# Patient Record
Sex: MALE | Race: WHITE | NOT HISPANIC OR LATINO | Employment: OTHER | URBAN - METROPOLITAN AREA
[De-identification: names, ages, dates, MRNs, and addresses within clinical notes are randomized per-mention and may not be internally consistent; named-entity substitution may affect disease eponyms.]

---

## 2020-07-13 ENCOUNTER — HOSPITAL ENCOUNTER (INPATIENT)
Facility: HOSPITAL | Age: 75
LOS: 2 days | Discharge: HOME HEALTH | DRG: 177 | End: 2020-07-15
Attending: EMERGENCY MEDICINE | Admitting: INTERNAL MEDICINE
Payer: MEDICARE

## 2020-07-13 DIAGNOSIS — J18.9 PNEUMONIA OF BOTH LUNGS DUE TO INFECTIOUS ORGANISM, UNSPECIFIED PART OF LUNG: ICD-10-CM

## 2020-07-13 DIAGNOSIS — U07.1 COVID-19 VIRUS INFECTION: Primary | ICD-10-CM

## 2020-07-13 DIAGNOSIS — R55 NEAR SYNCOPE: ICD-10-CM

## 2020-07-13 DIAGNOSIS — J96.01 ACUTE RESPIRATORY FAILURE WITH HYPOXIA: ICD-10-CM

## 2020-07-13 DIAGNOSIS — Z20.822 SUSPECTED COVID-19 VIRUS INFECTION: ICD-10-CM

## 2020-07-13 PROBLEM — R79.89 ELEVATED TROPONIN: Status: ACTIVE | Noted: 2020-07-13

## 2020-07-13 PROBLEM — I10 HTN (HYPERTENSION): Status: ACTIVE | Noted: 2020-07-13

## 2020-07-13 PROBLEM — I25.10 CAD (CORONARY ARTERY DISEASE): Status: ACTIVE | Noted: 2020-07-13

## 2020-07-13 PROBLEM — E78.5 HYPERLIPEMIA: Status: ACTIVE | Noted: 2020-07-13

## 2020-07-13 LAB
ALBUMIN SERPL BCP-MCNC: 2.6 G/DL (ref 3.5–5.2)
ALP SERPL-CCNC: 80 U/L (ref 55–135)
ALT SERPL W/O P-5'-P-CCNC: 87 U/L (ref 10–44)
ANION GAP SERPL CALC-SCNC: 12 MMOL/L (ref 8–16)
AST SERPL-CCNC: 72 U/L (ref 10–40)
BASOPHILS # BLD AUTO: 0.03 K/UL (ref 0–0.2)
BASOPHILS NFR BLD: 0.2 % (ref 0–1.9)
BILIRUB SERPL-MCNC: 1 MG/DL (ref 0.1–1)
BUN SERPL-MCNC: 25 MG/DL (ref 8–23)
CALCIUM SERPL-MCNC: 8.5 MG/DL (ref 8.7–10.5)
CHLORIDE SERPL-SCNC: 99 MMOL/L (ref 95–110)
CK SERPL-CCNC: 398 U/L (ref 20–200)
CO2 SERPL-SCNC: 21 MMOL/L (ref 23–29)
CREAT SERPL-MCNC: 1.3 MG/DL (ref 0.5–1.4)
CRP SERPL-MCNC: 86.7 MG/L (ref 0–8.2)
DIFFERENTIAL METHOD: ABNORMAL
EOSINOPHIL # BLD AUTO: 0.2 K/UL (ref 0–0.5)
EOSINOPHIL NFR BLD: 1.4 % (ref 0–8)
ERYTHROCYTE [DISTWIDTH] IN BLOOD BY AUTOMATED COUNT: 13.4 % (ref 11.5–14.5)
EST. GFR  (AFRICAN AMERICAN): >60 ML/MIN/1.73 M^2
EST. GFR  (NON AFRICAN AMERICAN): 53 ML/MIN/1.73 M^2
GLUCOSE SERPL-MCNC: 128 MG/DL (ref 70–110)
HCT VFR BLD AUTO: 34.7 % (ref 40–54)
HGB BLD-MCNC: 11.5 G/DL (ref 14–18)
IMM GRANULOCYTES # BLD AUTO: 0.15 K/UL (ref 0–0.04)
IMM GRANULOCYTES NFR BLD AUTO: 1.2 % (ref 0–0.5)
LACTATE SERPL-SCNC: 1.5 MMOL/L (ref 0.5–2.2)
LDH SERPL L TO P-CCNC: 461 U/L (ref 110–260)
LYMPHOCYTES # BLD AUTO: 1.1 K/UL (ref 1–4.8)
LYMPHOCYTES NFR BLD: 9 % (ref 18–48)
MCH RBC QN AUTO: 29.6 PG (ref 27–31)
MCHC RBC AUTO-ENTMCNC: 33.1 G/DL (ref 32–36)
MCV RBC AUTO: 89 FL (ref 82–98)
MONOCYTES # BLD AUTO: 1.2 K/UL (ref 0.3–1)
MONOCYTES NFR BLD: 10.2 % (ref 4–15)
NEUTROPHILS # BLD AUTO: 9.4 K/UL (ref 1.8–7.7)
NEUTROPHILS NFR BLD: 78 % (ref 38–73)
NRBC BLD-RTO: 0 /100 WBC
PLATELET # BLD AUTO: 615 K/UL (ref 150–350)
PMV BLD AUTO: 9.8 FL (ref 9.2–12.9)
POTASSIUM SERPL-SCNC: 3.9 MMOL/L (ref 3.5–5.1)
PROCALCITONIN SERPL IA-MCNC: 0.04 NG/ML
PROT SERPL-MCNC: 7 G/DL (ref 6–8.4)
RBC # BLD AUTO: 3.89 M/UL (ref 4.6–6.2)
SARS-COV-2 RDRP RESP QL NAA+PROBE: NEGATIVE
SODIUM SERPL-SCNC: 132 MMOL/L (ref 136–145)
TROPONIN I SERPL DL<=0.01 NG/ML-MCNC: 0.01 NG/ML (ref 0–0.03)
TROPONIN I SERPL DL<=0.01 NG/ML-MCNC: 0.03 NG/ML (ref 0–0.03)
TROPONIN I SERPL DL<=0.01 NG/ML-MCNC: <0.006 NG/ML (ref 0–0.03)
WBC # BLD AUTO: 12.1 K/UL (ref 3.9–12.7)

## 2020-07-13 PROCEDURE — 96361 HYDRATE IV INFUSION ADD-ON: CPT

## 2020-07-13 PROCEDURE — 83615 LACTATE (LD) (LDH) ENZYME: CPT

## 2020-07-13 PROCEDURE — 83605 ASSAY OF LACTIC ACID: CPT

## 2020-07-13 PROCEDURE — G0378 HOSPITAL OBSERVATION PER HR: HCPCS

## 2020-07-13 PROCEDURE — 21400001 HC TELEMETRY ROOM

## 2020-07-13 PROCEDURE — 93010 ELECTROCARDIOGRAM REPORT: CPT | Mod: ,,, | Performed by: INTERNAL MEDICINE

## 2020-07-13 PROCEDURE — 84145 PROCALCITONIN (PCT): CPT

## 2020-07-13 PROCEDURE — 96374 THER/PROPH/DIAG INJ IV PUSH: CPT

## 2020-07-13 PROCEDURE — 99291 CRITICAL CARE FIRST HOUR: CPT | Mod: 25

## 2020-07-13 PROCEDURE — 87040 BLOOD CULTURE FOR BACTERIA: CPT | Mod: 59

## 2020-07-13 PROCEDURE — 84484 ASSAY OF TROPONIN QUANT: CPT | Mod: 91

## 2020-07-13 PROCEDURE — 25000003 PHARM REV CODE 250: Performed by: NURSE PRACTITIONER

## 2020-07-13 PROCEDURE — 80053 COMPREHEN METABOLIC PANEL: CPT

## 2020-07-13 PROCEDURE — 63600175 PHARM REV CODE 636 W HCPCS: Performed by: EMERGENCY MEDICINE

## 2020-07-13 PROCEDURE — 82550 ASSAY OF CK (CPK): CPT

## 2020-07-13 PROCEDURE — 82728 ASSAY OF FERRITIN: CPT

## 2020-07-13 PROCEDURE — 36415 COLL VENOUS BLD VENIPUNCTURE: CPT

## 2020-07-13 PROCEDURE — 63600175 PHARM REV CODE 636 W HCPCS: Performed by: NURSE PRACTITIONER

## 2020-07-13 PROCEDURE — 96375 TX/PRO/DX INJ NEW DRUG ADDON: CPT

## 2020-07-13 PROCEDURE — 93010 EKG 12-LEAD: ICD-10-PCS | Mod: ,,, | Performed by: INTERNAL MEDICINE

## 2020-07-13 PROCEDURE — U0002 COVID-19 LAB TEST NON-CDC: HCPCS

## 2020-07-13 PROCEDURE — 96372 THER/PROPH/DIAG INJ SC/IM: CPT | Mod: 59

## 2020-07-13 PROCEDURE — 85025 COMPLETE CBC W/AUTO DIFF WBC: CPT

## 2020-07-13 PROCEDURE — 86140 C-REACTIVE PROTEIN: CPT

## 2020-07-13 PROCEDURE — 84484 ASSAY OF TROPONIN QUANT: CPT

## 2020-07-13 PROCEDURE — 93005 ELECTROCARDIOGRAM TRACING: CPT

## 2020-07-13 RX ORDER — METOPROLOL SUCCINATE 25 MG/1
25 TABLET, EXTENDED RELEASE ORAL DAILY
Status: DISCONTINUED | OUTPATIENT
Start: 2020-07-14 | End: 2020-07-15 | Stop reason: HOSPADM

## 2020-07-13 RX ORDER — PANTOPRAZOLE SODIUM 40 MG/1
40 TABLET, DELAYED RELEASE ORAL DAILY
Status: DISCONTINUED | OUTPATIENT
Start: 2020-07-14 | End: 2020-07-14

## 2020-07-13 RX ORDER — ENOXAPARIN SODIUM 100 MG/ML
40 INJECTION SUBCUTANEOUS EVERY 24 HOURS
Status: DISCONTINUED | OUTPATIENT
Start: 2020-07-13 | End: 2020-07-15 | Stop reason: HOSPADM

## 2020-07-13 RX ORDER — METOPROLOL SUCCINATE 25 MG/1
25 TABLET, EXTENDED RELEASE ORAL
COMMUNITY
Start: 2017-08-11

## 2020-07-13 RX ORDER — ATORVASTATIN CALCIUM 80 MG/1
TABLET, FILM COATED ORAL
COMMUNITY
Start: 2019-02-25

## 2020-07-13 RX ORDER — AZITHROMYCIN 250 MG/1
250 TABLET, FILM COATED ORAL DAILY
COMMUNITY

## 2020-07-13 RX ORDER — SODIUM CHLORIDE 9 MG/ML
INJECTION, SOLUTION INTRAVENOUS CONTINUOUS
Status: DISCONTINUED | OUTPATIENT
Start: 2020-07-13 | End: 2020-07-13

## 2020-07-13 RX ORDER — LEVOTHYROXINE SODIUM 75 UG/1
75 TABLET ORAL
Status: DISCONTINUED | OUTPATIENT
Start: 2020-07-14 | End: 2020-07-15 | Stop reason: HOSPADM

## 2020-07-13 RX ORDER — FINASTERIDE 5 MG/1
5 TABLET, FILM COATED ORAL NIGHTLY
Status: DISCONTINUED | OUTPATIENT
Start: 2020-07-13 | End: 2020-07-15 | Stop reason: HOSPADM

## 2020-07-13 RX ORDER — POTASSIUM CHLORIDE 750 MG/1
TABLET, EXTENDED RELEASE ORAL
COMMUNITY
Start: 2018-11-08

## 2020-07-13 RX ORDER — ASPIRIN 81 MG/1
81 TABLET ORAL DAILY
Status: DISCONTINUED | OUTPATIENT
Start: 2020-07-14 | End: 2020-07-15 | Stop reason: HOSPADM

## 2020-07-13 RX ORDER — ATORVASTATIN CALCIUM 40 MG/1
80 TABLET, FILM COATED ORAL NIGHTLY
Status: DISCONTINUED | OUTPATIENT
Start: 2020-07-13 | End: 2020-07-15 | Stop reason: HOSPADM

## 2020-07-13 RX ORDER — IPRATROPIUM BROMIDE AND ALBUTEROL SULFATE 2.5; .5 MG/3ML; MG/3ML
3 SOLUTION RESPIRATORY (INHALATION) EVERY 4 HOURS PRN
Status: DISCONTINUED | OUTPATIENT
Start: 2020-07-13 | End: 2020-07-15 | Stop reason: HOSPADM

## 2020-07-13 RX ORDER — FINASTERIDE 5 MG/1
TABLET, FILM COATED ORAL
COMMUNITY
Start: 2019-07-22

## 2020-07-13 RX ORDER — TAMSULOSIN HYDROCHLORIDE 0.4 MG/1
CAPSULE ORAL
COMMUNITY
Start: 2019-05-13

## 2020-07-13 RX ORDER — PANTOPRAZOLE SODIUM 40 MG/1
TABLET, DELAYED RELEASE ORAL
COMMUNITY
Start: 2019-05-13

## 2020-07-13 RX ORDER — IRBESARTAN 150 MG/1
TABLET ORAL
COMMUNITY
Start: 2019-02-14

## 2020-07-13 RX ORDER — LEVOTHYROXINE SODIUM 75 UG/1
TABLET ORAL
COMMUNITY
Start: 2019-05-06

## 2020-07-13 RX ORDER — NITROGLYCERIN 0.4 MG/1
0.4 TABLET SUBLINGUAL
COMMUNITY

## 2020-07-13 RX ORDER — BUTALBITAL, ACETAMINOPHEN AND CAFFEINE 50; 325; 40 MG/1; MG/1; MG/1
1 TABLET ORAL 3 TIMES DAILY PRN
COMMUNITY
Start: 2018-08-23 | End: 2020-07-13

## 2020-07-13 RX ORDER — RANOLAZINE 1000 MG/1
1000 TABLET, EXTENDED RELEASE ORAL
COMMUNITY
Start: 2018-11-16

## 2020-07-13 RX ORDER — LOSARTAN POTASSIUM 25 MG/1
25 TABLET ORAL DAILY
Status: DISCONTINUED | OUTPATIENT
Start: 2020-07-14 | End: 2020-07-15 | Stop reason: HOSPADM

## 2020-07-13 RX ORDER — ISOSORBIDE MONONITRATE 30 MG/1
TABLET, EXTENDED RELEASE ORAL
COMMUNITY
Start: 2019-03-30

## 2020-07-13 RX ORDER — VITAMIN E 268 MG
400 CAPSULE ORAL
COMMUNITY

## 2020-07-13 RX ORDER — FUROSEMIDE 20 MG/1
TABLET ORAL
COMMUNITY
Start: 2019-05-13

## 2020-07-13 RX ORDER — ASPIRIN 81 MG/1
81 TABLET ORAL
COMMUNITY

## 2020-07-13 RX ADMIN — ATORVASTATIN CALCIUM 80 MG: 40 TABLET, FILM COATED ORAL at 09:07

## 2020-07-13 RX ADMIN — SODIUM CHLORIDE, SODIUM LACTATE, POTASSIUM CHLORIDE, AND CALCIUM CHLORIDE 1000 ML: .6; .31; .03; .02 INJECTION, SOLUTION INTRAVENOUS at 12:07

## 2020-07-13 RX ADMIN — AZITHROMYCIN MONOHYDRATE 500 MG: 500 INJECTION, POWDER, LYOPHILIZED, FOR SOLUTION INTRAVENOUS at 09:07

## 2020-07-13 RX ADMIN — CEFTRIAXONE 1 G: 1 INJECTION, SOLUTION INTRAVENOUS at 06:07

## 2020-07-13 RX ADMIN — FINASTERIDE 5 MG: 5 TABLET, FILM COATED ORAL at 09:07

## 2020-07-13 RX ADMIN — ENOXAPARIN SODIUM 40 MG: 40 INJECTION SUBCUTANEOUS at 04:07

## 2020-07-13 NOTE — ED NOTES
Per Katie Irwin, NP patient needs COVID retest if greater that >72 hours. Verbal order for COVID swab.

## 2020-07-13 NOTE — H&P
[facility] Hospital Medicine  History & Physical    Patient Name: Brenton Ugalde  MRN: [id number]  Admission Date: 7/13/2020  Attending Physician: Luis Lo MD    Primary Care Provider: Lizy Ayala MD         Patient information was obtained from patient, relative(s) and ER records.     Subjective:     Principal Problem:Acute respiratory failure with hypoxia    Chief Complaint:   Chief Complaint   Patient presents with    COVID-19 Concerns     positive COVID, diagnosed with bilateral PNA, recently d/c from [facility], had a syncopal episode, O2 has been in the high 80's over the last 2 days        HPI: Brenton Ugalde is a 75 y.o. male patient with a PMHx of HTN, CAD, BPH, Hyperlipidemia, Hypothyroidism, and   pneumonia who presents to the Emergency Department for evaluation of worsening SOB. Patient was dx with COVID by PCP on 7/2/20 and was given a z-pack. On 7/7/20 pt admitted to [facility] [place] with bilateral pneumonia and was later transferred to [facility], and was discharged yesterday. Pt had a home pulse ox saturation in the mid 70's. Associated sxs include N/V, generalized myalgias, cough, and generalized weakness. Patient denies any CP, diarrhea, HA, dizziness, numbness, and all other sxs at this time. In the ED, Na 132, BUN 25, CRP 86.7, , troponin 0.034, CXR revealed bilateral PNA. Patient placed in observation for PNA, elevated troponin, and hypoxia.         Past Medical History:   Diagnosis Date    Hypertension     PNA (pneumonia)        Past Surgical History:   Procedure Laterality Date    CARDIAC SURGERY  2008       Review of patient's allergies indicates:  No Known Allergies    No current facility-administered medications on file prior to encounter.      Current Outpatient Medications on File Prior to Encounter   Medication Sig    ALBUTEROL INHL Inhale into the lungs.    aspirin (ECOTRIN) 81 MG EC tablet Take 81 mg by mouth.    atorvastatin (LIPITOR)  80 MG tablet TAKE ONE TABLET BY MOUTH EVERY EVENING    azithromycin (ZITHROMAX Z-CATY) 250 MG tablet Take 250 mg by mouth once daily.    finasteride (PROSCAR) 5 mg tablet TAKE ONE TABLET BY MOUTH EVERY DAY IN THE EVENING    furosemide (LASIX) 20 MG tablet TAKE ONE TABLET BY MOUTH EVERY DAY.    irbesartan (AVAPRO) 150 MG tablet TAKE ONE TABLET BY MOUTH EVERY DAY    levothyroxine (SYNTHROID) 75 MCG tablet TAKE ONE TABLET BY MOUTH EVERY DAY ON AN EMPTY STOMACH    metoprolol succinate (TOPROL-XL) 25 MG 24 hr tablet Take 25 mg by mouth.    pantoprazole (PROTONIX) 40 MG tablet TAKE ONE TABLET EVERY DAY    potassium chloride SA (K-DUR,KLOR-CON) 10 MEQ tablet TAKE ONE TABLET EVERY DAY    ranolazine (RANEXA) 1,000 mg Tb12 Take 1,000 mg by mouth.    tamsulosin (FLOMAX) 0.4 mg Cap TAKE ONE CAPSULE BY MOUTH EVERY DAY IN THE EVENING    vitamin E 400 UNIT capsule Take 400 Units by mouth.    [DISCONTINUED] butalbital-acetaminophen-caffeine -40 mg (FIORICET, ESGIC) -40 mg per tablet Take 1 tablet by mouth 3 (three) times daily as needed.    isosorbide mononitrate (IMDUR) 30 MG 24 hr tablet     nitroGLYCERIN (NITROSTAT) 0.4 MG SL tablet Place 0.4 mg under the tongue.     Family History     None        Tobacco Use    Smoking status: Never Smoker    Smokeless tobacco: Never Used   Substance and Sexual Activity    Alcohol use: Not Currently    Drug use: Never    Sexual activity: Not on file     Review of Systems   Constitutional: Positive for fatigue.   HENT: Negative.    Eyes: Negative.    Respiratory: Positive for cough and shortness of breath.    Cardiovascular: Negative.  Negative for chest pain, palpitations and leg swelling.   Gastrointestinal: Positive for nausea and vomiting. Negative for abdominal pain and diarrhea.   Endocrine: Negative.    Genitourinary: Negative.  Negative for dysuria, flank pain, frequency and urgency.   Musculoskeletal: Positive for myalgias.   Skin: Negative.     Allergic/Immunologic: Negative.    Neurological: Negative.  Negative for dizziness, speech difficulty, weakness and headaches.   Hematological: Negative.    Psychiatric/Behavioral: Negative.      Objective:     Vital Signs (Most Recent):  Temp: 98.1 °F (36.7 °C) (07/13/20 1231)  Pulse: 67 (07/13/20 1600)  Resp: 18 (07/13/20 1600)  BP: 127/72 (07/13/20 1601)  SpO2: 96 % (07/13/20 1600) Vital Signs (24h Range):  Temp:  [98.1 °F (36.7 °C)] 98.1 °F (36.7 °C)  Pulse:  [66-75] 67  Resp:  [18-20] 18  SpO2:  [92 %-96 %] 96 %  BP: ()/(55-72) 127/72     Weight: 81.2 kg (179 lb)  Body mass index is 25.68 kg/m².    Physical Exam  Vitals signs and nursing note reviewed.   Constitutional:       Appearance: Normal appearance. He is well-developed.   HENT:      Head: Normocephalic and atraumatic.   Eyes:      Pupils: Pupils are equal, round, and reactive to light.   Neck:      Musculoskeletal: Normal range of motion and neck supple.   Cardiovascular:      Rate and Rhythm: Normal rate and regular rhythm.      Pulses: Normal pulses.      Heart sounds: Normal heart sounds.   Pulmonary:      Effort: Pulmonary effort is normal. No tachypnea, accessory muscle usage or respiratory distress.      Breath sounds: Examination of the right-lower field reveals rales. Examination of the left-lower field reveals rales. Rales present.   Abdominal:      General: Bowel sounds are normal.      Palpations: Abdomen is soft.      Tenderness: There is no abdominal tenderness.   Musculoskeletal: Normal range of motion.   Skin:     General: Skin is warm and dry.      Capillary Refill: Capillary refill takes less than 2 seconds.   Neurological:      Mental Status: He is alert and oriented to person, place, and time.      Deep Tendon Reflexes: Reflexes are normal and symmetric.   Psychiatric:         Speech: Speech normal.         Behavior: Behavior normal.         Thought Content: Thought content normal.         Judgment: Judgment normal.           Significant Labs:   Blood Culture: No results for input(s): LABBLOO in the last 48 hours.  BMP:   Recent Labs   Lab 07/13/20  1251   *   *   K 3.9   CL 99   CO2 21*   BUN 25*   CREATININE 1.3   CALCIUM 8.5*     CBC:   Recent Labs   Lab 07/13/20  1251   WBC 12.10   HGB 11.5*   HCT 34.7*   *     CMP:   Recent Labs   Lab 07/13/20  1251   *   K 3.9   CL 99   CO2 21*   *   BUN 25*   CREATININE 1.3   CALCIUM 8.5*   PROT 7.0   ALBUMIN 2.6*   BILITOT 1.0   ALKPHOS 80   AST 72*   ALT 87*   ANIONGAP 12   EGFRNONAA 53*     Urine Studies: No results for input(s): COLORU, APPEARANCEUA, PHUR, SPECGRAV, PROTEINUA, GLUCUA, KETONESU, BILIRUBINUA, OCCULTUA, NITRITE, UROBILINOGEN, LEUKOCYTESUR, RBCUA, WBCUA, BACTERIA, SQUAMEPITHEL, HYALINECASTS in the last 48 hours.    Invalid input(s): WRIGHTSUR  All pertinent labs within the past 24 hours have been reviewed.    Significant Imaging:  Imaging Results          X-Ray Chest AP Portable (Final result)  Result time 07/13/20 13:35:01    Final result by Ean Franco MD (07/13/20 13:35:01)                 Impression:      1.  Significant peripheral and basilar ground-glass/alveolar infiltrates consistent with the history given of suspected COVID-19 infection.    2.  Incidental findings as noted above.      Electronically signed by: Ean Franco MD  Date:    07/13/2020  Time:    13:35             Narrative:    EXAMINATION:  XR CHEST AP PORTABLE    CLINICAL HISTORY:  Suspected Covid-19 Virus Infection;    COMPARISON:  No comparison studies are available.    FINDINGS:  EKG leads overlie the chest.  Peripheral patchy ground-glass and alveolar opacities are seen throughout the mid lower lungs as well as in the left retrocardiac region.  The cardiac silhouette size is normal. The trachea is midline and the mediastinal width is normal. Negative for effusion or pneumothorax.  Pulmonary vasculature is normal. Negative for osseous abnormalities. Median sternotomy  wires and CABG changes.  Aortic arch calcifications.  Degenerative changes of the spine and both shoulder girdles.                              Assessment/Plan:     * Acute respiratory failure with hypoxia  Likely due to Bilateral PNA   O2 sats 95-98% on RA   Supplemental oxygen   Duoneb tx prn         Elevated troponin  Denies chest pain   EKG unrevealing   Initial troponin 0.034  Serial troponin         PNA (pneumonia)  Blood cx  IV rocephin and azithromycin   Duoneb tx prn   Supplemental oxygen   Pt was recently admitted to [facility] for bilateral pna. Spoke with pharmacist at [facility] pt did not receive abx during his stay.       Hyperlipemia  Continue statin         CAD (coronary artery disease)  Continue ASA, BB, Losartan, Statin   Denies any chest pain   EKG unrevealing         HTN (hypertension)  Continue losartan and BB with holding parameters   Monitor closely       VTE Risk Mitigation (From admission, onward)         Ordered     enoxaparin injection 40 mg  Every 24 hours      07/13/20 1619     IP VTE HIGH RISK PATIENT  Once      07/13/20 1619     Place sequential compression device  Until discontinued      07/13/20 1619                   Phyllis Lock NP  Department of Hospital Medicine   [facility]

## 2020-07-13 NOTE — ED PROVIDER NOTES
SCRIBE #1 NOTE: I, Emoryodilia White, am scribing for, and in the presence of, Bruce Shah MD. I have scribed the entire note.       History     Chief Complaint   Patient presents with    COVID-19 Concerns     positive COVID, diagnosed with bilateral PNA, recently d/c from [facility], had a syncopal episode, O2 has been in the high 80's over the last 2 days     Review of patient's allergies indicates:  No Known Allergies      History of Present Illness     HPI    7/13/2020, 12:14 PM  History obtained from the patient      History of Present Illness: Brenton Ugalde is a 75 y.o. male patient with a PMHx of pneumonia who presents to the Emergency Department for evaluation of COVID-19 concerns which onset gradually 2 days PTA. Pt was dx'd with bilateral pneumonia and admitted to [facility] on [facility], was transferred to [facility], and was discharged from [facility] 2 days ago. Pt had a home pulse ox saturation in the mid 70's as well as increasing SOB. Symptoms are worsening and moderate in severity. No mitigating or exacerbating factors reported. Associated sxs include SOB, N/V, generalized myalgias, cough, and generalized weakness. Patient denies any CP, diarrhea, HA, dizziness, numbness, and all other sxs at this time. No prior Tx reported. No further complaints or concerns at this time.       Arrival mode: Personal vehicle    PCP: Lizy Ayala MD        Past Medical History:  Past Medical History:   Diagnosis Date    Hypertension     PNA (pneumonia)        Past Surgical History:  Past Surgical History:   Procedure Laterality Date    CARDIAC SURGERY  2008         Family History:  History reviewed. No pertinent family history.     Social History:  Social History     Tobacco Use    Smoking status: Unknown   Substance and Sexual Activity    Alcohol use: Unknown    Drug use: Unknown    Sexual activity: Unknown        Review of Systems     Review of Systems   Constitutional: Negative for chills and fever.   HENT:  Negative for sore throat.    Respiratory: Positive for cough and shortness of breath.    Cardiovascular: Negative for chest pain and leg swelling.   Gastrointestinal: Positive for nausea and vomiting. Negative for abdominal pain and diarrhea.   Genitourinary: Negative for dysuria.   Musculoskeletal: Positive for myalgias (Generalized). Negative for back pain, neck pain and neck stiffness.   Skin: Negative for rash and wound.   Neurological: Positive for weakness (Generalized). Negative for dizziness, light-headedness, numbness and headaches.   Hematological: Does not bruise/bleed easily.   All other systems reviewed and are negative.     Physical Exam     Initial Vitals   BP Pulse Resp Temp SpO2   07/13/20 1157 07/13/20 1157 07/13/20 1231 07/13/20 1231 07/13/20 1157   (!) 94/55 67 20 98.1 °F (36.7 °C) (!) 92 %      MAP       --                 Physical Exam  Nursing Notes and Vital Signs Reviewed.  Constitutional: Patient is in no acute distress. Ill-appearing.  Head: Atraumatic. Normocephalic.  Eyes: PERRL. EOM intact. Conjunctivae are not pale. No scleral icterus.  ENT: Mucous membranes are dry. Oropharynx is clear and symmetric.    Neck: Supple. Full ROM.  Cardiovascular: Regular rate. Regular rhythm. No murmurs, rubs, or gallops. Distal pulses are 2+ and symmetric.  Pulmonary/Chest: Tachypnea. Scattered crackles.  Abdominal: Soft and non-distended. There is no tenderness to palpation. No rebound or guarding.  Genitourinary: No CVA tenderness  Musculoskeletal: Moves all extremities. No obvious deformities. No edema. No calf tenderness.  Skin: Warm and dry.  Neurological:  Alert, awake, and appropriate.  Normal speech.  No acute focal neurological deficits are appreciated.  Psychiatric: Normal affect. Good eye contact. Appropriate in content.     ED Course   Critical Care    Date/Time: 7/13/2020 1:08 PM  Performed by: Bruce Shah MD  Authorized by: Bruce Shah MD   Direct patient critical care  time: 15 minutes  Additional history critical care time: 5 minutes  Ordering / reviewing critical care time: 10 minutes  Documentation critical care time: 5 minutes  Consulting other physicians critical care time: 5 minutes  Total critical care time (exclusive of procedural time) : 40 minutes  Critical care time was exclusive of separately billable procedures and treating other patients and teaching time.  Critical care was necessary to treat or prevent imminent or life-threatening deterioration of the following conditions: respiratory failure (Hypoxia, severe COVID19 infection).  Critical care was time spent personally by me on the following activities: blood draw for specimens, development of treatment plan with patient or surrogate, discussions with consultants, interpretation of cardiac output measurements, evaluation of patient's response to treatment, examination of patient, obtaining history from patient or surrogate, ordering and performing treatments and interventions, ordering and review of laboratory studies, ordering and review of radiographic studies, pulse oximetry, re-evaluation of patient's condition and review of old charts.        ED Vital Signs:  Vitals:    07/13/20 1522 07/13/20 1531 07/13/20 1600 07/13/20 1601   BP:  135/69  127/72   Pulse:  67 67    Resp:   18    Temp:       TempSrc:       SpO2:  95% 96%    Weight: 81.2 kg (179 lb)      Height:        07/13/20 1627 07/13/20 1631 07/13/20 1708 07/13/20 1735   BP:  122/72  (!) 149/71   Pulse: 70 70  70   Resp: 19   17   Temp:   98.5 °F (36.9 °C) 97.9 °F (36.6 °C)   TempSrc:   Oral Oral   SpO2: 97% 95%  (!) 94%   Weight:       Height:        07/13/20 1955 07/14/20 0015 07/14/20 0416 07/14/20 0500   BP: 102/62 98/62 (!) 97/59    Pulse: 74 78 70    Resp: 18 18 18    Temp: 98.1 °F (36.7 °C) 98.4 °F (36.9 °C) 98.2 °F (36.8 °C)    TempSrc: Oral Axillary Oral    SpO2: 96% (!) 92% 98%    Weight:    78.9 kg (173 lb 15.1 oz)   Height:        07/14/20 0705  07/14/20 0815 07/14/20 0900   BP:  115/66    Pulse: 71 71 73   Resp:  18    Temp:  98.2 °F (36.8 °C)    TempSrc:  Oral    SpO2:  (!) 92% (!) 92%   Weight:      Height:          Abnormal Lab Results:  Labs Reviewed   CBC W/ AUTO DIFFERENTIAL - Abnormal; Notable for the following components:       Result Value    RBC 3.89 (*)     Hemoglobin 11.5 (*)     Hematocrit 34.7 (*)     Platelets 615 (*)     Immature Granulocytes 1.2 (*)     Gran # (ANC) 9.4 (*)     Immature Grans (Abs) 0.15 (*)     Mono # 1.2 (*)     Gran% 78.0 (*)     Lymph% 9.0 (*)     All other components within normal limits   COMPREHENSIVE METABOLIC PANEL - Abnormal; Notable for the following components:    Sodium 132 (*)     CO2 21 (*)     Glucose 128 (*)     BUN, Bld 25 (*)     Calcium 8.5 (*)     Albumin 2.6 (*)     AST 72 (*)     ALT 87 (*)     eGFR if non  53 (*)     All other components within normal limits   C-REACTIVE PROTEIN - Abnormal; Notable for the following components:    CRP 86.7 (*)     All other components within normal limits   LACTATE DEHYDROGENASE - Abnormal; Notable for the following components:     (*)     All other components within normal limits   CK - Abnormal; Notable for the following components:     (*)     All other components within normal limits   TROPONIN I - Abnormal; Notable for the following components:    Troponin I 0.034 (*)     All other components within normal limits   LACTIC ACID, PLASMA   PROCALCITONIN   SARS-COV-2 RNA AMPLIFICATION, QUAL        All Lab Results:  Results for orders placed or performed during the hospital encounter of 07/13/20   Blood culture x two cultures. Draw prior to antibiotics.    Specimen: Peripheral, Antecubital, Left; Blood   Result Value Ref Range    Blood Culture, Routine No Growth to date    Blood culture x two cultures. Draw prior to antibiotics.    Specimen: Peripheral, Wrist, Left; Blood   Result Value Ref Range    Blood Culture, Routine No Growth to  date    CBC auto differential   Result Value Ref Range    WBC 12.10 3.90 - 12.70 K/uL    RBC 3.89 (L) 4.60 - 6.20 M/uL    Hemoglobin 11.5 (L) 14.0 - 18.0 g/dL    Hematocrit 34.7 (L) 40.0 - 54.0 %    Mean Corpuscular Volume 89 82 - 98 fL    Mean Corpuscular Hemoglobin 29.6 27.0 - 31.0 pg    Mean Corpuscular Hemoglobin Conc 33.1 32.0 - 36.0 g/dL    RDW 13.4 11.5 - 14.5 %    Platelets 615 (H) 150 - 350 K/uL    MPV 9.8 9.2 - 12.9 fL    Immature Granulocytes 1.2 (H) 0.0 - 0.5 %    Gran # (ANC) 9.4 (H) 1.8 - 7.7 K/uL    Immature Grans (Abs) 0.15 (H) 0.00 - 0.04 K/uL    Lymph # 1.1 1.0 - 4.8 K/uL    Mono # 1.2 (H) 0.3 - 1.0 K/uL    Eos # 0.2 0.0 - 0.5 K/uL    Baso # 0.03 0.00 - 0.20 K/uL    nRBC 0 0 /100 WBC    Gran% 78.0 (H) 38.0 - 73.0 %    Lymph% 9.0 (L) 18.0 - 48.0 %    Mono% 10.2 4.0 - 15.0 %    Eosinophil% 1.4 0.0 - 8.0 %    Basophil% 0.2 0.0 - 1.9 %    Differential Method Automated    Comprehensive metabolic panel   Result Value Ref Range    Sodium 132 (L) 136 - 145 mmol/L    Potassium 3.9 3.5 - 5.1 mmol/L    Chloride 99 95 - 110 mmol/L    CO2 21 (L) 23 - 29 mmol/L    Glucose 128 (H) 70 - 110 mg/dL    BUN, Bld 25 (H) 8 - 23 mg/dL    Creatinine 1.3 0.5 - 1.4 mg/dL    Calcium 8.5 (L) 8.7 - 10.5 mg/dL    Total Protein 7.0 6.0 - 8.4 g/dL    Albumin 2.6 (L) 3.5 - 5.2 g/dL    Total Bilirubin 1.0 0.1 - 1.0 mg/dL    Alkaline Phosphatase 80 55 - 135 U/L    AST 72 (H) 10 - 40 U/L    ALT 87 (H) 10 - 44 U/L    Anion Gap 12 8 - 16 mmol/L    eGFR if African American >60 >60 mL/min/1.73 m^2    eGFR if non African American 53 (A) >60 mL/min/1.73 m^2   C-Reactive Protein   Result Value Ref Range    CRP 86.7 (H) 0.0 - 8.2 mg/L   Ferritin   Result Value Ref Range    Ferritin 1,962 (H) 20.0 - 300.0 ng/mL   Lactate Dehydrogenase   Result Value Ref Range     (H) 110 - 260 U/L   CK   Result Value Ref Range     (H) 20 - 200 U/L   Lactic Acid, Plasma   Result Value Ref Range    Lactate (Lactic Acid) 1.5 0.5 - 2.2 mmol/L    Troponin I   Result Value Ref Range    Troponin I 0.034 (H) 0.000 - 0.026 ng/mL   Procalcitonin   Result Value Ref Range    Procalcitonin 0.04 <0.25 ng/mL   COVID-19 Rapid Screening   Result Value Ref Range    SARS-CoV-2 RNA, Amplification, Qual Negative Negative   Troponin I   Result Value Ref Range    Troponin I <0.006 0.000 - 0.026 ng/mL   Troponin I   Result Value Ref Range    Troponin I 0.015 0.000 - 0.026 ng/mL   Basic Metabolic Panel (BMP)   Result Value Ref Range    Sodium 133 (L) 136 - 145 mmol/L    Potassium 3.8 3.5 - 5.1 mmol/L    Chloride 102 95 - 110 mmol/L    CO2 24 23 - 29 mmol/L    Glucose 94 70 - 110 mg/dL    BUN, Bld 13 8 - 23 mg/dL    Creatinine 0.8 0.5 - 1.4 mg/dL    Calcium 7.9 (L) 8.7 - 10.5 mg/dL    Anion Gap 7 (L) 8 - 16 mmol/L    eGFR if African American >60 >60 mL/min/1.73 m^2    eGFR if non African American >60 >60 mL/min/1.73 m^2   CBC with Automated Differential   Result Value Ref Range    WBC 9.46 3.90 - 12.70 K/uL    RBC 3.80 (L) 4.60 - 6.20 M/uL    Hemoglobin 11.1 (L) 14.0 - 18.0 g/dL    Hematocrit 34.1 (L) 40.0 - 54.0 %    Mean Corpuscular Volume 90 82 - 98 fL    Mean Corpuscular Hemoglobin 29.2 27.0 - 31.0 pg    Mean Corpuscular Hemoglobin Conc 32.6 32.0 - 36.0 g/dL    RDW 13.2 11.5 - 14.5 %    Platelets 616 (H) 150 - 350 K/uL    MPV 9.5 9.2 - 12.9 fL    Immature Granulocytes 1.3 (H) 0.0 - 0.5 %    Gran # (ANC) 6.4 1.8 - 7.7 K/uL    Immature Grans (Abs) 0.12 (H) 0.00 - 0.04 K/uL    Lymph # 1.3 1.0 - 4.8 K/uL    Mono # 1.4 (H) 0.3 - 1.0 K/uL    Eos # 0.3 0.0 - 0.5 K/uL    Baso # 0.02 0.00 - 0.20 K/uL    nRBC 0 0 /100 WBC    Gran% 67.1 38.0 - 73.0 %    Lymph% 14.2 (L) 18.0 - 48.0 %    Mono% 14.3 4.0 - 15.0 %    Eosinophil% 2.9 0.0 - 8.0 %    Basophil% 0.2 0.0 - 1.9 %    Differential Method Automated        Imaging Results:  Imaging Results          X-Ray Chest AP Portable (Final result)  Result time 07/13/20 13:35:01    Final result by Ean Franco MD (07/13/20 13:35:01)                  Impression:      1.  Significant peripheral and basilar ground-glass/alveolar infiltrates consistent with the history given of suspected COVID-19 infection.    2.  Incidental findings as noted above.      Electronically signed by: Ean Franco MD  Date:    07/13/2020  Time:    13:35             Narrative:    EXAMINATION:  XR CHEST AP PORTABLE    CLINICAL HISTORY:  Suspected Covid-19 Virus Infection;    COMPARISON:  No comparison studies are available.    FINDINGS:  EKG leads overlie the chest.  Peripheral patchy ground-glass and alveolar opacities are seen throughout the mid lower lungs as well as in the left retrocardiac region.  The cardiac silhouette size is normal. The trachea is midline and the mediastinal width is normal. Negative for effusion or pneumothorax.  Pulmonary vasculature is normal. Negative for osseous abnormalities. Median sternotomy wires and CABG changes.  Aortic arch calcifications.  Degenerative changes of the spine and both shoulder girdles.                                 The EKG was ordered, reviewed, and independently interpreted by the ED provider.  Interpretation time: 1246  Rate: 67 BPM  Rhythm: normal sinus rhythm  Interpretation: No acute ST changes. No STEMI.           The Emergency Provider reviewed the vital signs and test results, which are outlined above.     ED Discussion     3:01 PM: Discussed case with Luis Martinez MD (Hospital Medicine). Dr. Baird agrees with current care and management of pt and accepts admission.   Admitting Service: Hospital Medicine  Admitting Physician: Dr. Baird  Admit to: Observation/Tele    3:09 PM: Re-evaluated pt. I have discussed test results, shared treatment plan, and the need for admission with patient at bedside. Pt expresses understanding at this time and agrees with all information. All questions answered. Pt has no further questions or concerns at this time. Pt is ready for admit.       Medical Decision Making:    Clinical Tests:   Lab Tests: Ordered and Reviewed  Radiological Study: Ordered and Reviewed  Medical Tests: Ordered and Reviewed           ED Medication(s):  Medications   aspirin EC tablet 81 mg (81 mg Oral Given 7/14/20 0932)   atorvastatin tablet 80 mg (80 mg Oral Given 7/13/20 2121)   finasteride tablet 5 mg (5 mg Oral Given 7/13/20 2121)   levothyroxine tablet 75 mcg (75 mcg Oral Given 7/14/20 0542)   metoprolol succinate (TOPROL-XL) 24 hr tablet 25 mg (25 mg Oral Given 7/14/20 0932)   losartan tablet 25 mg (25 mg Oral Given 7/14/20 0932)   enoxaparin injection 40 mg (40 mg Subcutaneous Given 7/13/20 1640)   albuterol-ipratropium 2.5 mg-0.5 mg/3 mL nebulizer solution 3 mL (has no administration in time range)   zinc sulfate capsule 220 mg (220 mg Oral Given 7/14/20 1117)   ascorbic acid (vitamin C) tablet 500 mg (500 mg Oral Given 7/14/20 1117)   dexAMETHasone tablet 6 mg (6 mg Oral Given 7/14/20 1117)   famotidine tablet 20 mg (20 mg Oral Given 7/14/20 1117)   docusate sodium capsule 100 mg (100 mg Oral Given 7/14/20 1120)   polyethylene glycol packet 17 g (17 g Oral Given 7/14/20 1120)   lactated ringers bolus 1,000 mL (0 mLs Intravenous Stopped 7/13/20 1400)       Current Discharge Medication List                  Scribe Attestation:   Scribe #1: I performed the above scribed service and the documentation accurately describes the services I performed. I attest to the accuracy of the note.     Attending:   Physician Attestation Statement for Scribe #1: I, Bruce Shah MD, personally performed the services described in this documentation, as scribed by Emory White, in my presence, and it is both accurate and complete.           Clinical Impression       ICD-10-CM ICD-9-CM   1. COVID-19 virus infection  U07.1    2. Suspected Covid-19 Virus Infection  R68.89    3. Near syncope  R55 780.2       Disposition:   Disposition: Placed in Observation  Condition: Fair       Bruce Shah MD  07/14/20  [id number]

## 2020-07-13 NOTE — HPI
Brenton Ugalde is a 75 y.o. male patient with a PMHx of HTN, CAD, BPH, Hyperlipidemia, Hypothyroidism, and   pneumonia who presents to the Emergency Department for evaluation of worsening SOB. Patient was dx with COVID by PCP on 7/2/20 and was given a z-pack. On 7/7/20 pt admitted to [facility] with bilateral pneumonia and was later transferred to [facility], and was discharged yesterday. Pt had a home pulse ox saturation in the mid 70's. Associated sxs include N/V, generalized myalgias, cough, and generalized weakness. Patient denies any CP, diarrhea, HA, dizziness, numbness, and all other sxs at this time. In the ED, Na 132, BUN 25, CRP 86.7, , troponin 0.034, CXR revealed bilateral PNA. Patient placed in observation for PNA, elevated troponin, and hypoxia.

## 2020-07-13 NOTE — ED NOTES
MD notified during walk test patient became SOB and c/o dizziness after 1 min of walking next to the bed for 1 min. Pt's O2 is 92% on RA.

## 2020-07-13 NOTE — ED NOTES
Telemetry notified patient tested positive on 7/2/20. Pt still exhibiting COVID symptom, even after testing COVID negative today.  Telemetry attempting to reassign room at this time.

## 2020-07-13 NOTE — ASSESSMENT & PLAN NOTE
Blood cx  IV rocephin and azithromycin   Duoneb tx prn   Supplemental oxygen   Pt was recently admitted to [facility] for bilateral pna. Spoke with pharmacist at [facility] pt did not receive abx during his stay.

## 2020-07-13 NOTE — SUBJECTIVE & OBJECTIVE
Past Medical History:   Diagnosis Date    Hypertension     PNA (pneumonia)        Past Surgical History:   Procedure Laterality Date    CARDIAC SURGERY  2008       Review of patient's allergies indicates:  No Known Allergies    No current facility-administered medications on file prior to encounter.      Current Outpatient Medications on File Prior to Encounter   Medication Sig    ALBUTEROL INHL Inhale into the lungs.    aspirin (ECOTRIN) 81 MG EC tablet Take 81 mg by mouth.    atorvastatin (LIPITOR) 80 MG tablet TAKE ONE TABLET BY MOUTH EVERY EVENING    azithromycin (ZITHROMAX Z-CATY) 250 MG tablet Take 250 mg by mouth once daily.    finasteride (PROSCAR) 5 mg tablet TAKE ONE TABLET BY MOUTH EVERY DAY IN THE EVENING    furosemide (LASIX) 20 MG tablet TAKE ONE TABLET BY MOUTH EVERY DAY.    irbesartan (AVAPRO) 150 MG tablet TAKE ONE TABLET BY MOUTH EVERY DAY    levothyroxine (SYNTHROID) 75 MCG tablet TAKE ONE TABLET BY MOUTH EVERY DAY ON AN EMPTY STOMACH    metoprolol succinate (TOPROL-XL) 25 MG 24 hr tablet Take 25 mg by mouth.    pantoprazole (PROTONIX) 40 MG tablet TAKE ONE TABLET EVERY DAY    potassium chloride SA (K-DUR,KLOR-CON) 10 MEQ tablet TAKE ONE TABLET EVERY DAY    ranolazine (RANEXA) 1,000 mg Tb12 Take 1,000 mg by mouth.    tamsulosin (FLOMAX) 0.4 mg Cap TAKE ONE CAPSULE BY MOUTH EVERY DAY IN THE EVENING    vitamin E 400 UNIT capsule Take 400 Units by mouth.    [DISCONTINUED] butalbital-acetaminophen-caffeine -40 mg (FIORICET, ESGIC) -40 mg per tablet Take 1 tablet by mouth 3 (three) times daily as needed.    isosorbide mononitrate (IMDUR) 30 MG 24 hr tablet     nitroGLYCERIN (NITROSTAT) 0.4 MG SL tablet Place 0.4 mg under the tongue.     Family History     None        Tobacco Use    Smoking status: Never Smoker    Smokeless tobacco: Never Used   Substance and Sexual Activity    Alcohol use: Not Currently    Drug use: Never    Sexual activity: Not on file     Review of  Systems   Constitutional: Positive for fatigue.   HENT: Negative.    Eyes: Negative.    Respiratory: Positive for cough and shortness of breath.    Cardiovascular: Negative.  Negative for chest pain, palpitations and leg swelling.   Gastrointestinal: Positive for nausea and vomiting. Negative for abdominal pain and diarrhea.   Endocrine: Negative.    Genitourinary: Negative.  Negative for dysuria, flank pain, frequency and urgency.   Musculoskeletal: Positive for myalgias.   Skin: Negative.    Allergic/Immunologic: Negative.    Neurological: Negative.  Negative for dizziness, speech difficulty, weakness and headaches.   Hematological: Negative.    Psychiatric/Behavioral: Negative.      Objective:     Vital Signs (Most Recent):  Temp: 98.1 °F (36.7 °C) (07/13/20 1231)  Pulse: 67 (07/13/20 1600)  Resp: 18 (07/13/20 1600)  BP: 127/72 (07/13/20 1601)  SpO2: 96 % (07/13/20 1600) Vital Signs (24h Range):  Temp:  [98.1 °F (36.7 °C)] 98.1 °F (36.7 °C)  Pulse:  [66-75] 67  Resp:  [18-20] 18  SpO2:  [92 %-96 %] 96 %  BP: ()/(55-72) 127/72     Weight: 81.2 kg (179 lb)  Body mass index is 25.68 kg/m².    Physical Exam  Vitals signs and nursing note reviewed.   Constitutional:       Appearance: Normal appearance. He is well-developed.   HENT:      Head: Normocephalic and atraumatic.   Eyes:      Pupils: Pupils are equal, round, and reactive to light.   Neck:      Musculoskeletal: Normal range of motion and neck supple.   Cardiovascular:      Rate and Rhythm: Normal rate and regular rhythm.      Pulses: Normal pulses.      Heart sounds: Normal heart sounds.   Pulmonary:      Effort: Pulmonary effort is normal. No tachypnea, accessory muscle usage or respiratory distress.      Breath sounds: Examination of the right-lower field reveals rales. Examination of the left-lower field reveals rales. Rales present.   Abdominal:      General: Bowel sounds are normal.      Palpations: Abdomen is soft.      Tenderness: There is no  abdominal tenderness.   Musculoskeletal: Normal range of motion.   Skin:     General: Skin is warm and dry.      Capillary Refill: Capillary refill takes less than 2 seconds.   Neurological:      Mental Status: He is alert and oriented to person, place, and time.      Deep Tendon Reflexes: Reflexes are normal and symmetric.   Psychiatric:         Speech: Speech normal.         Behavior: Behavior normal.         Thought Content: Thought content normal.         Judgment: Judgment normal.          Significant Labs:   Blood Culture: No results for input(s): LABBLOO in the last 48 hours.  BMP:   Recent Labs   Lab 07/13/20  1251   *   *   K 3.9   CL 99   CO2 21*   BUN 25*   CREATININE 1.3   CALCIUM 8.5*     CBC:   Recent Labs   Lab 07/13/20  1251   WBC 12.10   HGB 11.5*   HCT 34.7*   *     CMP:   Recent Labs   Lab 07/13/20  1251   *   K 3.9   CL 99   CO2 21*   *   BUN 25*   CREATININE 1.3   CALCIUM 8.5*   PROT 7.0   ALBUMIN 2.6*   BILITOT 1.0   ALKPHOS 80   AST 72*   ALT 87*   ANIONGAP 12   EGFRNONAA 53*     Urine Studies: No results for input(s): COLORU, APPEARANCEUA, PHUR, SPECGRAV, PROTEINUA, GLUCUA, KETONESU, BILIRUBINUA, OCCULTUA, NITRITE, UROBILINOGEN, LEUKOCYTESUR, RBCUA, WBCUA, BACTERIA, SQUAMEPITHEL, HYALINECASTS in the last 48 hours.    Invalid input(s): WRIGHTSUR  All pertinent labs within the past 24 hours have been reviewed.    Significant Imaging:  Imaging Results          X-Ray Chest AP Portable (Final result)  Result time 07/13/20 13:35:01    Final result by Ean Franco MD (07/13/20 13:35:01)                 Impression:      1.  Significant peripheral and basilar ground-glass/alveolar infiltrates consistent with the history given of suspected COVID-19 infection.    2.  Incidental findings as noted above.      Electronically signed by: Ean Franco MD  Date:    07/13/2020  Time:    13:35             Narrative:    EXAMINATION:  XR CHEST AP PORTABLE    CLINICAL  HISTORY:  Suspected Covid-19 Virus Infection;    COMPARISON:  No comparison studies are available.    FINDINGS:  EKG leads overlie the chest.  Peripheral patchy ground-glass and alveolar opacities are seen throughout the mid lower lungs as well as in the left retrocardiac region.  The cardiac silhouette size is normal. The trachea is midline and the mediastinal width is normal. Negative for effusion or pneumothorax.  Pulmonary vasculature is normal. Negative for osseous abnormalities. Median sternotomy wires and CABG changes.  Aortic arch calcifications.  Degenerative changes of the spine and both shoulder girdles.

## 2020-07-14 LAB
ANION GAP SERPL CALC-SCNC: 7 MMOL/L (ref 8–16)
BASOPHILS # BLD AUTO: 0.02 K/UL (ref 0–0.2)
BASOPHILS NFR BLD: 0.2 % (ref 0–1.9)
BUN SERPL-MCNC: 13 MG/DL (ref 8–23)
CALCIUM SERPL-MCNC: 7.9 MG/DL (ref 8.7–10.5)
CHLORIDE SERPL-SCNC: 102 MMOL/L (ref 95–110)
CO2 SERPL-SCNC: 24 MMOL/L (ref 23–29)
CREAT SERPL-MCNC: 0.8 MG/DL (ref 0.5–1.4)
DIFFERENTIAL METHOD: ABNORMAL
EOSINOPHIL # BLD AUTO: 0.3 K/UL (ref 0–0.5)
EOSINOPHIL NFR BLD: 2.9 % (ref 0–8)
ERYTHROCYTE [DISTWIDTH] IN BLOOD BY AUTOMATED COUNT: 13.2 % (ref 11.5–14.5)
EST. GFR  (AFRICAN AMERICAN): >60 ML/MIN/1.73 M^2
EST. GFR  (NON AFRICAN AMERICAN): >60 ML/MIN/1.73 M^2
FERRITIN SERPL-MCNC: 1962 NG/ML (ref 20–300)
GLUCOSE SERPL-MCNC: 94 MG/DL (ref 70–110)
HCT VFR BLD AUTO: 34.1 % (ref 40–54)
HGB BLD-MCNC: 11.1 G/DL (ref 14–18)
IMM GRANULOCYTES # BLD AUTO: 0.12 K/UL (ref 0–0.04)
IMM GRANULOCYTES NFR BLD AUTO: 1.3 % (ref 0–0.5)
LYMPHOCYTES # BLD AUTO: 1.3 K/UL (ref 1–4.8)
LYMPHOCYTES NFR BLD: 14.2 % (ref 18–48)
MCH RBC QN AUTO: 29.2 PG (ref 27–31)
MCHC RBC AUTO-ENTMCNC: 32.6 G/DL (ref 32–36)
MCV RBC AUTO: 90 FL (ref 82–98)
MONOCYTES # BLD AUTO: 1.4 K/UL (ref 0.3–1)
MONOCYTES NFR BLD: 14.3 % (ref 4–15)
NEUTROPHILS # BLD AUTO: 6.4 K/UL (ref 1.8–7.7)
NEUTROPHILS NFR BLD: 67.1 % (ref 38–73)
NRBC BLD-RTO: 0 /100 WBC
PLATELET # BLD AUTO: 616 K/UL (ref 150–350)
PMV BLD AUTO: 9.5 FL (ref 9.2–12.9)
POTASSIUM SERPL-SCNC: 3.8 MMOL/L (ref 3.5–5.1)
RBC # BLD AUTO: 3.8 M/UL (ref 4.6–6.2)
SODIUM SERPL-SCNC: 133 MMOL/L (ref 136–145)
WBC # BLD AUTO: 9.46 K/UL (ref 3.9–12.7)

## 2020-07-14 PROCEDURE — 25000003 PHARM REV CODE 250: Performed by: NURSE PRACTITIONER

## 2020-07-14 PROCEDURE — 25000003 PHARM REV CODE 250: Performed by: INTERNAL MEDICINE

## 2020-07-14 PROCEDURE — 27000221 HC OXYGEN, UP TO 24 HOURS

## 2020-07-14 PROCEDURE — 97161 PT EVAL LOW COMPLEX 20 MIN: CPT

## 2020-07-14 PROCEDURE — 36415 COLL VENOUS BLD VENIPUNCTURE: CPT

## 2020-07-14 PROCEDURE — 97530 THERAPEUTIC ACTIVITIES: CPT | Performed by: PHYSICAL THERAPIST

## 2020-07-14 PROCEDURE — 63600175 PHARM REV CODE 636 W HCPCS: Performed by: NURSE PRACTITIONER

## 2020-07-14 PROCEDURE — 80048 BASIC METABOLIC PNL TOTAL CA: CPT

## 2020-07-14 PROCEDURE — 97116 GAIT TRAINING THERAPY: CPT

## 2020-07-14 PROCEDURE — 97165 OT EVAL LOW COMPLEX 30 MIN: CPT | Performed by: PHYSICAL THERAPIST

## 2020-07-14 PROCEDURE — 94761 N-INVAS EAR/PLS OXIMETRY MLT: CPT

## 2020-07-14 PROCEDURE — 85025 COMPLETE CBC W/AUTO DIFF WBC: CPT

## 2020-07-14 PROCEDURE — 63600175 PHARM REV CODE 636 W HCPCS: Performed by: INTERNAL MEDICINE

## 2020-07-14 PROCEDURE — 21400001 HC TELEMETRY ROOM

## 2020-07-14 RX ORDER — FAMOTIDINE 20 MG/1
20 TABLET, FILM COATED ORAL 2 TIMES DAILY
Status: DISCONTINUED | OUTPATIENT
Start: 2020-07-14 | End: 2020-07-15 | Stop reason: HOSPADM

## 2020-07-14 RX ORDER — POLYETHYLENE GLYCOL 3350 17 G/17G
17 POWDER, FOR SOLUTION ORAL DAILY
Status: DISCONTINUED | OUTPATIENT
Start: 2020-07-14 | End: 2020-07-15 | Stop reason: HOSPADM

## 2020-07-14 RX ORDER — DOCUSATE SODIUM 100 MG/1
100 CAPSULE, LIQUID FILLED ORAL DAILY
Status: DISCONTINUED | OUTPATIENT
Start: 2020-07-14 | End: 2020-07-15 | Stop reason: HOSPADM

## 2020-07-14 RX ORDER — GUAIFENESIN 100 MG/5ML
200 SOLUTION ORAL EVERY 4 HOURS PRN
Status: DISCONTINUED | OUTPATIENT
Start: 2020-07-14 | End: 2020-07-15 | Stop reason: HOSPADM

## 2020-07-14 RX ORDER — ZINC SULFATE 50(220)MG
220 CAPSULE ORAL DAILY
Status: DISCONTINUED | OUTPATIENT
Start: 2020-07-14 | End: 2020-07-15 | Stop reason: HOSPADM

## 2020-07-14 RX ORDER — ASCORBIC ACID 500 MG
500 TABLET ORAL 2 TIMES DAILY
Status: DISCONTINUED | OUTPATIENT
Start: 2020-07-14 | End: 2020-07-15 | Stop reason: HOSPADM

## 2020-07-14 RX ADMIN — DEXAMETHASONE 6 MG: 4 TABLET ORAL at 11:07

## 2020-07-14 RX ADMIN — POLYETHYLENE GLYCOL 3350 17 G: 17 POWDER, FOR SOLUTION ORAL at 11:07

## 2020-07-14 RX ADMIN — LOSARTAN POTASSIUM 25 MG: 25 TABLET ORAL at 09:07

## 2020-07-14 RX ADMIN — OXYCODONE HYDROCHLORIDE AND ACETAMINOPHEN 500 MG: 500 TABLET ORAL at 08:07

## 2020-07-14 RX ADMIN — Medication 220 MG: at 11:07

## 2020-07-14 RX ADMIN — ASPIRIN 81 MG: 81 TABLET, COATED ORAL at 09:07

## 2020-07-14 RX ADMIN — LEVOTHYROXINE SODIUM 75 MCG: 75 TABLET ORAL at 05:07

## 2020-07-14 RX ADMIN — METOPROLOL SUCCINATE 25 MG: 25 TABLET, EXTENDED RELEASE ORAL at 09:07

## 2020-07-14 RX ADMIN — FAMOTIDINE 20 MG: 20 TABLET ORAL at 11:07

## 2020-07-14 RX ADMIN — OXYCODONE HYDROCHLORIDE AND ACETAMINOPHEN 500 MG: 500 TABLET ORAL at 11:07

## 2020-07-14 RX ADMIN — DOCUSATE SODIUM 100 MG: 100 CAPSULE, LIQUID FILLED ORAL at 11:07

## 2020-07-14 RX ADMIN — ENOXAPARIN SODIUM 40 MG: 40 INJECTION SUBCUTANEOUS at 04:07

## 2020-07-14 RX ADMIN — FAMOTIDINE 20 MG: 20 TABLET ORAL at 08:07

## 2020-07-14 RX ADMIN — ATORVASTATIN CALCIUM 80 MG: 40 TABLET, FILM COATED ORAL at 08:07

## 2020-07-14 RX ADMIN — PANTOPRAZOLE SODIUM 40 MG: 40 TABLET, DELAYED RELEASE ORAL at 09:07

## 2020-07-14 RX ADMIN — GUAIFENESIN 200 MG: 200 SOLUTION ORAL at 08:07

## 2020-07-14 RX ADMIN — FINASTERIDE 5 MG: 5 TABLET, FILM COATED ORAL at 08:07

## 2020-07-14 NOTE — PROGRESS NOTES
Home Oxygen Evaluation    Date Performed: 2020    1) Patient's Home O2 Sat on room air, while at rest: 96        If O2 sats on room air at rest are 88% or below, patient qualifies. No additional testing needed. Document N/A in steps 2 and 3. If 89% or above, complete steps 2.      2) Patient's O2 Sat on room air while exercisin        If O2 sats on room air while exercising remain 89% or above patient does not qualify, no further testing needed Document N/A in step 3. If O2 sats on room air while exercising are 88% or below, continue to step 3.      3) Patient's O2 Sat while exercising on O2: NA at NA LPM         (Must show improvement from #2 for patients to qualify)    If O2 sats improve on oxygen, patient qualifies for portable oxygen. If not, the patient does not qualify.

## 2020-07-14 NOTE — ASSESSMENT & PLAN NOTE
Pt Had a (+) COVID-19 test  A few days ago  Rapid test for this admission is negative   Cont Multivitamins and minerals   Started on decadron

## 2020-07-14 NOTE — PROGRESS NOTES
[facility] Hospital Medicine  Progress Note    Patient Name: Brenton Ugalde  MRN: [id number]  Patient Class: IP- Inpatient   Admission Date: 7/13/2020  Length of Stay: 0 days  Attending Physician: Luis Martinez, *  Primary Care Provider: Lizy Ayala MD        Subjective:     Principal Problem:Acute respiratory failure with hypoxia        HPI:  Brenton Ugalde is a 75 y.o. male patient with a PMHx of HTN, CAD, BPH, Hyperlipidemia, Hypothyroidism, and   pneumonia who presents to the Emergency Department for evaluation of worsening SOB. Patient was dx with COVID by PCP on 7/2/20 and was given a z-pack. On 7/7/20 pt admitted to [facility] with bilateral pneumonia and was later transferred to [facility], and was discharged yesterday. Pt had a home pulse ox saturation in the mid 70's. Associated sxs include N/V, generalized myalgias, cough, and generalized weakness. Patient denies any CP, diarrhea, HA, dizziness, numbness, and all other sxs at this time. In the ED, Na 132, BUN 25, CRP 86.7, , troponin 0.034, CXR revealed bilateral PNA. Patient placed in observation for PNA, elevated troponin, and hypoxia.         Overview/Hospital Course:  76 y/o wm admitted with a Dx of COVID-19 PNA . He  Was dx  With COVID-19 a few dasy ago and d/c home .  The o2 eval form today  Show a  o2 sat > 92 % at rest and on exertion .        Interval History:     Review of Systems   Constitutional: Positive for appetite change and unexpected weight change.   HENT: Negative.    Eyes: Negative.    Respiratory: Positive for shortness of breath.    Cardiovascular: Negative.    Gastrointestinal: Negative.    Endocrine: Negative.    Genitourinary: Negative.    Musculoskeletal: Negative.    Allergic/Immunologic: Negative.    Neurological: Negative.    Hematological: Negative.    Psychiatric/Behavioral: Negative.      Objective:     Vital Signs (Most Recent):  Temp: 98.2 °F (36.8 °C) (07/14/20  0815)  Pulse: 110 (07/14/20 1300)  Resp: 18 (07/14/20 0815)  BP: 115/66 (07/14/20 0815)  SpO2: (!) 92 % (07/14/20 0900) Vital Signs (24h Range):  Temp:  [97.9 °F (36.6 °C)-98.5 °F (36.9 °C)] 98.2 °F (36.8 °C)  Pulse:  [] 110  Resp:  [17-19] 18  SpO2:  [92 %-98 %] 92 %  BP: ()/(59-72) 115/66     Weight: 78.9 kg (173 lb 15.1 oz)  Body mass index is 24.96 kg/m².    Intake/Output Summary (Last 24 hours) at 7/14/2020 1423  Last data filed at 7/14/2020 0900  Gross per 24 hour   Intake 240 ml   Output 600 ml   Net -360 ml      Physical Exam  Constitutional:       General: He is not in acute distress.     Appearance: Normal appearance. He is not ill-appearing.   HENT:      Head: Normocephalic and atraumatic.      Right Ear: Tympanic membrane normal.      Left Ear: Tympanic membrane normal.      Nose: Nose normal. No congestion or rhinorrhea.      Mouth/Throat:      Mouth: Mucous membranes are moist.   Eyes:      General:         Right eye: No discharge.         Left eye: No discharge.      Extraocular Movements: Extraocular movements intact.      Pupils: Pupils are equal, round, and reactive to light.   Neck:      Musculoskeletal: Normal range of motion and neck supple. No neck rigidity or muscular tenderness.   Cardiovascular:      Rate and Rhythm: Normal rate and regular rhythm.      Pulses: Normal pulses.      Heart sounds: Normal heart sounds. No murmur. No friction rub.   Pulmonary:      Effort: Pulmonary effort is normal. No respiratory distress.      Breath sounds: Normal breath sounds. No stridor.   Abdominal:      General: Abdomen is flat. Bowel sounds are normal. There is no distension.      Palpations: Abdomen is soft. There is no mass.   Musculoskeletal: Normal range of motion.         General: No swelling or tenderness.   Skin:     General: Skin is warm.      Capillary Refill: Capillary refill takes less than 2 seconds.      Coloration: Skin is not jaundiced or pale.   Neurological:      General: No  focal deficit present.      Mental Status: He is alert. Mental status is at baseline.      Cranial Nerves: No cranial nerve deficit.      Sensory: No sensory deficit.   Psychiatric:         Mood and Affect: Mood normal.         Significant Labs: All pertinent labs within the past 24 hours have been reviewed.    Significant Imaging: I have reviewed all pertinent imaging results/findings within the past 24 hours.      Assessment/Plan:      * Acute respiratory failure with hypoxia  Likely due to Bilateral PNA   O2 sats 95-98% on RA   Supplemental oxygen   Duoneb tx prn   Pt did not qualify for home 02  Per  Respiratory o2 eval         Elevated troponin  Denies chest pain   EKG unrevealing   Troponin level back to normal           PNA (pneumonia)  Blood cx  IV rocephin and azithromycin   Duoneb tx prn   Supplemental oxygen   Pt was recently admitted to [facility] for bilateral pna. Spoke with pharmacist at [facility] pt did not receive abx during his stay.       Hyperlipemia  Continue statin         CAD (coronary artery disease)  Continue ASA, BB, Losartan, Statin   Denies any chest pain   EKG unrevealing         HTN (hypertension)  Continue losartan and BB with holding parameters   Monitor closely       Suspected Covid-19 Virus Infection  Pt Had a (+) COVID-19 test  A few days ago  Rapid test for this admission is negative   Cont Multivitamins and minerals   Started on decadron         VTE Risk Mitigation (From admission, onward)         Ordered     enoxaparin injection 40 mg  Every 24 hours      07/13/20 1619     IP VTE HIGH RISK PATIENT  Once      07/13/20 1619     Place sequential compression device  Until discontinued      07/13/20 1619                      Luis Martinez MD  Department of Hospital Medicine   [facility]

## 2020-07-14 NOTE — SUBJECTIVE & OBJECTIVE
Interval History:     Review of Systems   Constitutional: Positive for appetite change and unexpected weight change.   HENT: Negative.    Eyes: Negative.    Respiratory: Positive for shortness of breath.    Cardiovascular: Negative.    Gastrointestinal: Negative.    Endocrine: Negative.    Genitourinary: Negative.    Musculoskeletal: Negative.    Allergic/Immunologic: Negative.    Neurological: Negative.    Hematological: Negative.    Psychiatric/Behavioral: Negative.      Objective:     Vital Signs (Most Recent):  Temp: 98.2 °F (36.8 °C) (07/14/20 0815)  Pulse: 110 (07/14/20 1300)  Resp: 18 (07/14/20 0815)  BP: 115/66 (07/14/20 0815)  SpO2: (!) 92 % (07/14/20 0900) Vital Signs (24h Range):  Temp:  [97.9 °F (36.6 °C)-98.5 °F (36.9 °C)] 98.2 °F (36.8 °C)  Pulse:  [] 110  Resp:  [17-19] 18  SpO2:  [92 %-98 %] 92 %  BP: ()/(59-72) 115/66     Weight: 78.9 kg (173 lb 15.1 oz)  Body mass index is 24.96 kg/m².    Intake/Output Summary (Last 24 hours) at 7/14/2020 1423  Last data filed at 7/14/2020 0900  Gross per 24 hour   Intake 240 ml   Output 600 ml   Net -360 ml      Physical Exam  Constitutional:       General: He is not in acute distress.     Appearance: Normal appearance. He is not ill-appearing.   HENT:      Head: Normocephalic and atraumatic.      Right Ear: Tympanic membrane normal.      Left Ear: Tympanic membrane normal.      Nose: Nose normal. No congestion or rhinorrhea.      Mouth/Throat:      Mouth: Mucous membranes are moist.   Eyes:      General:         Right eye: No discharge.         Left eye: No discharge.      Extraocular Movements: Extraocular movements intact.      Pupils: Pupils are equal, round, and reactive to light.   Neck:      Musculoskeletal: Normal range of motion and neck supple. No neck rigidity or muscular tenderness.   Cardiovascular:      Rate and Rhythm: Normal rate and regular rhythm.      Pulses: Normal pulses.      Heart sounds: Normal heart sounds. No murmur. No  friction rub.   Pulmonary:      Effort: Pulmonary effort is normal. No respiratory distress.      Breath sounds: Normal breath sounds. No stridor.   Abdominal:      General: Abdomen is flat. Bowel sounds are normal. There is no distension.      Palpations: Abdomen is soft. There is no mass.   Musculoskeletal: Normal range of motion.         General: No swelling or tenderness.   Skin:     General: Skin is warm.      Capillary Refill: Capillary refill takes less than 2 seconds.      Coloration: Skin is not jaundiced or pale.   Neurological:      General: No focal deficit present.      Mental Status: He is alert. Mental status is at baseline.      Cranial Nerves: No cranial nerve deficit.      Sensory: No sensory deficit.   Psychiatric:         Mood and Affect: Mood normal.         Significant Labs: All pertinent labs within the past 24 hours have been reviewed.    Significant Imaging: I have reviewed all pertinent imaging results/findings within the past 24 hours.

## 2020-07-14 NOTE — PT/OT/SLP EVAL
Occupational Therapy   Evaluation and Discharge Note    Name: Brenton Ugalde  MRN: [id number]  Admitting Diagnosis:  Acute respiratory failure with hypoxia      Recommendations:     Discharge Recommendations: home health PT  Discharge Equipment Recommendations:  none  Barriers to discharge:  None    Assessment:     Brenton Ugalde is a 75 y.o. male with a medical diagnosis of Acute respiratory failure with hypoxia. At this time, patient is functioning at their prior level of function and does not require further acute OT services.     Plan:     During this hospitalization, patient does not require further acute OT services.  Please re-consult if situation changes.    · Plan of Care Reviewed with: patient    Subjective     Chief Complaint: weakness  Patient/Family Comments/goals: to return home    Occupational Profile:  Living Environment: son lives with him in 1 story house with no steps  Previous level of function: (I) with ADLs and ambulation  Roles and Routines: drives, works in his garden  Equipment Used at home:  walker, rolling, cane, straight  Assistance upon Discharge: family    Pain/Comfort:  · Pain Rating 1: 0/10    Patients cultural, spiritual, Quaker conflicts given the current situation:      Objective:     Communicated with: Nurse Ramirez and epic chart review prior to session.  Patient found supine with bed alarm, peripheral IV upon OT entry to room.    General Precautions: Standard,     Orthopedic Precautions:N/A   Braces: N/A     Occupational Performance:    Bed Mobility:    · Patient completed Rolling/Turning to Left with  modified independence  · Patient completed Rolling/Turning to Right with modified independence  · Patient completed Scooting/Bridging with modified independence  · Patient completed Supine to Sit with modified independence    Functional Mobility/Transfers:  · Patient completed Sit <> Stand Transfer with modified independence  with  no assistive device   · Patient completed Bed  <> Chair Transfer using Step Transfer technique with independence with no assistive device  · Functional Mobility: ~80 ft Mod I no AD      Activities of Daily Living:  · Upper Body Dressing: modified independence .  · Lower Body Dressing: modified independence .    Cognitive/Visual Perceptual:  Cognitive/Psychosocial Skills:     -       Oriented to: Person, Place, Time and Situation   -       Follows Commands/attention:Follows multistep  commands  -       Communication: clear/fluent  -       Memory: No Deficits noted  -       Safety awareness/insight to disability: intact   Visual/Perceptual:      -Intact .    Physical Exam:  Dominant hand:    -       right  Upper Extremity Range of Motion:     -       Right Upper Extremity: WNL  -       Left Upper Extremity: WNL  Upper Extremity Strength:    -       Right Upper Extremity: WNL  -       Left Upper Extremity: WNL   Strength:    -       Right Upper Extremity: WNL  -       Left Upper Extremity: WNL    AMPAC 6 Click ADL:  AMPAC Total Score: 24    Treatment & Education:  OT Eval completed as listed above. Pt functioning at high level and does not require skilled OT at this time.   Education:    Patient left up in chair with all lines intact, call button in reach, chair alarm on and Nurse Ashley notified    GOALS:   Multidisciplinary Problems     Occupational Therapy Goals     Not on file                History:     Past Medical History:   Diagnosis Date    Hypertension     PNA (pneumonia)        Past Surgical History:   Procedure Laterality Date    CARDIAC SURGERY  2008       Time Tracking:     OT Date of Treatment: 07/14/20  OT Start Time: 1115  OT Stop Time: 1145  OT Total Time (min): 30 min    Billable Minutes:Evaluation 15 min  Therapeutic Activity 10 min    Ольга Ma PT/OT  7/14/2020

## 2020-07-14 NOTE — HOSPITAL COURSE
74 y/o wm admitted with a Dx of COVID-19 PNA . He  Was dx  With COVID-19 a few dasy ago and d/c home .  The o2 eval form today  Show a  o2 sat > 92 % at rest and on exertion .    7/15  Pt was seen and examined at bedside . He was determined to be suitable for d/c   -Pt did not qualify for home o2  -PT/OT recommend HH   -There was no acute event  Since admission  -He denies any SI/VH/AH

## 2020-07-14 NOTE — PLAN OF CARE
Problem: Respiratory Compromise (Pneumonia)  Goal: Effective Oxygenation and Ventilation  Outcome: Ongoing, Progressing   Pt now on RA, oxygen saturation 94%  Oriented x 4  Repeated COVID test negative  IV dry and intact  PT/OT following pt  NO FALLS today  Pt asks before he gets up  AVASYS sitter at bedside

## 2020-07-14 NOTE — PLAN OF CARE
07/14/20 1210   Discharge Assessment   Assessment Type Discharge Planning Assessment   Confirmed/corrected address and phone number on facesheet? Yes   Assessment information obtained from? Caregiver   Prior to hospitilization cognitive status: Alert/Oriented   Prior to hospitalization functional status: Independent   Current cognitive status: Alert/Oriented   Current Functional Status: Independent   Lives With alone   Able to Return to Prior Arrangements yes   Is patient able to care for self after discharge? Yes   Who are your caregiver(s) and their phone number(s)? Yogesh Ugalde (son) [phone]   Patient's perception of discharge disposition home health   Readmission Within the Last 30 Days no previous admission in last 30 days   Patient currently being followed by outpatient case management? No   Patient currently receives any other outside agency services? No   Equipment Currently Used at Home none   Do you have any problems affording any of your prescribed medications? No   Is the patient taking medications as prescribed? yes   Does the patient have transportation home? Yes   Transportation Anticipated family or friend will provide   Does the patient receive services at the [facility]mad[facility] Clinic? No   Discharge Plan A Home Health   Discharge Plan B Home Health   Patient/Family in Agreement with Plan yes     Spoke with pt's son Yogesh via phone for DC assessment. Pt lives alone at home, his wife having passed ~2 weeks ago. Per pt's son, pt has expressed his wish to die and has lost ~30 pounds during the same 2 week period. Pt's son requested a home O2 eval, home health, and psych consult. Yogesh would prefer [facility] because his family has had positive previous encounters. Requests forwarded to pt's attending MD. Yogesh opted for bedside medication delivery. Pt's typical pharmacy is [facility] in [place].       [facility] - [address] [address]  Phone: [phone] Fax:  [phone]    PCP: MD Corey Wyatt [facility] 7/14/2020 12:22 PM

## 2020-07-14 NOTE — ASSESSMENT & PLAN NOTE
Likely due to Bilateral PNA   O2 sats 95-98% on RA   Supplemental oxygen   Duoneb tx prn   Pt did not qualify for home 02  Per  Respiratory o2 eval

## 2020-07-14 NOTE — PT/OT/SLP EVAL
Physical Therapy Evaluation and Discharge Note    Patient Name:  Brenton Ugalde   MRN:  [id number]    Recommendations:     Discharge Recommendations:  home health PT   Discharge Equipment Recommendations: none   Barriers to discharge: None    Assessment:     Brenton Ugalde is a 75 y.o. male admitted with a medical diagnosis of Acute respiratory failure with hypoxia. .  At this time, patient is functioning at their prior level of function and does not require further acute PT services.     Recent Surgery: * No surgery found *     Plan:     During this hospitalization, patient does not require further acute PT services.  Please re-consult if situation changes.      Subjective     Chief Complaint:   Patient/Family Comments/goals:   Pain/Comfort:  · Pain Rating 1: 0/10    Patients cultural, spiritual, Yarsani conflicts given the current situation:      Living Environment:  PT'S SON IS CURRENTLY LIVING WITH HIM, 1 STORY HOUSE NO STEPS, AMB INDEP IN HOME, INDEP WITH ADL'S, STILL DRIVES  Prior to admission, patients level of function was INDEP.  Equipment used at home: none.  DME owned (not currently used): rolling walker and single point cane.  Upon discharge, patient will have assistance from SON.    Objective:     Communicated with NURSE RY prior to session.  Patient found supine with telemetry, peripheral IV(CRISTIAN SYS) upon PT entry to room.    General Precautions: Standard, airborne, contact, droplet, respiratory   Orthopedic Precautions:N/A   Braces: N/A     Exams:  · Cognitive Exam:  Patient is oriented to Person, Place, Time and Situation  · Postural Exam:  Patient presented with the following abnormalities:    · -       Rounded shoulders  · Sensation:    · -       Intact  · RLE ROM: WNL  · RLE Strength: WNL  · LLE ROM: WNL  · LLE Strength: WNL    Functional Mobility:  · Bed Mobility:     · Rolling Left:  modified independence  · Scooting: modified independence  · Supine to Sit: modified  independence  · Transfers:     · Sit to Stand:  modified independence with no AD  · Bed to Chair: modified independence with  no AD  using  Step Transfer  · Gait: PT AMB 80' IN ROOM PATRICIA, NO LOB OR SOB ON ROOM AIR  · Balance: GOOD    AM-PAC 6 CLICK MOBILITY  Total Score:21     Therapeutic Activities and Exercises:   PT EDUCATED IN ROLE OF P.T., PT ENCOURAGED TO INCREASE TIME OOB IN CHAIR, PT AGREEABLE    AM-PAC 6 CLICK MOBILITY  Total Score:21     Patient left up in chair with all lines intact, call button in reach, chair alarm on and NURSE notified.    GOALS:   Multidisciplinary Problems     Physical Therapy Goals     Not on file                History:     Past Medical History:   Diagnosis Date    Hypertension     PNA (pneumonia)        Past Surgical History:   Procedure Laterality Date    CARDIAC SURGERY  2008       Time Tracking:     PT Received On: 07/14/20  PT Start Time: 1125     PT Stop Time: 1150  PT Total Time (min): 25 min     Billable Minutes: Evaluation 15 and Gait Training 10    Eleni Wright, PT  07/14/2020

## 2020-07-14 NOTE — PLAN OF CARE
AAOx3, VSS, NADN, 2 L NC, IV abx infusing ,NSR on cardiac monitor.  Pt remains free of falls.No complaints at this time.Safety measures in place. Will continue to monitor. Bed alarm on. Informed pt to call for assistance before getting up. Pt verbalizes understanding.

## 2020-07-15 VITALS
SYSTOLIC BLOOD PRESSURE: 107 MMHG | RESPIRATION RATE: 20 BRPM | OXYGEN SATURATION: 92 % | BODY MASS INDEX: 25.21 KG/M2 | DIASTOLIC BLOOD PRESSURE: 69 MMHG | TEMPERATURE: 97 F | HEIGHT: 70 IN | WEIGHT: 176.13 LBS | HEART RATE: 64 BPM

## 2020-07-15 PROBLEM — J96.01 ACUTE RESPIRATORY FAILURE WITH HYPOXIA: Status: RESOLVED | Noted: 2020-07-13 | Resolved: 2020-07-15

## 2020-07-15 PROBLEM — R79.89 ELEVATED TROPONIN: Status: RESOLVED | Noted: 2020-07-13 | Resolved: 2020-07-15

## 2020-07-15 LAB
ANION GAP SERPL CALC-SCNC: 10 MMOL/L (ref 8–16)
BASOPHILS # BLD AUTO: 0.02 K/UL (ref 0–0.2)
BASOPHILS NFR BLD: 0.2 % (ref 0–1.9)
BUN SERPL-MCNC: 13 MG/DL (ref 8–23)
CALCIUM SERPL-MCNC: 8.6 MG/DL (ref 8.7–10.5)
CHLORIDE SERPL-SCNC: 102 MMOL/L (ref 95–110)
CO2 SERPL-SCNC: 23 MMOL/L (ref 23–29)
CREAT SERPL-MCNC: 0.9 MG/DL (ref 0.5–1.4)
DIFFERENTIAL METHOD: ABNORMAL
EOSINOPHIL # BLD AUTO: 0.1 K/UL (ref 0–0.5)
EOSINOPHIL NFR BLD: 0.5 % (ref 0–8)
ERYTHROCYTE [DISTWIDTH] IN BLOOD BY AUTOMATED COUNT: 13.1 % (ref 11.5–14.5)
EST. GFR  (AFRICAN AMERICAN): >60 ML/MIN/1.73 M^2
EST. GFR  (NON AFRICAN AMERICAN): >60 ML/MIN/1.73 M^2
GLUCOSE SERPL-MCNC: 100 MG/DL (ref 70–110)
HCT VFR BLD AUTO: 34 % (ref 40–54)
HGB BLD-MCNC: 11 G/DL (ref 14–18)
IMM GRANULOCYTES # BLD AUTO: 0.12 K/UL (ref 0–0.04)
IMM GRANULOCYTES NFR BLD AUTO: 1 % (ref 0–0.5)
LYMPHOCYTES # BLD AUTO: 1.5 K/UL (ref 1–4.8)
LYMPHOCYTES NFR BLD: 12.6 % (ref 18–48)
MCH RBC QN AUTO: 28.6 PG (ref 27–31)
MCHC RBC AUTO-ENTMCNC: 32.4 G/DL (ref 32–36)
MCV RBC AUTO: 89 FL (ref 82–98)
MONOCYTES # BLD AUTO: 1.5 K/UL (ref 0.3–1)
MONOCYTES NFR BLD: 12.3 % (ref 4–15)
NEUTROPHILS # BLD AUTO: 8.8 K/UL (ref 1.8–7.7)
NEUTROPHILS NFR BLD: 73.4 % (ref 38–73)
NRBC BLD-RTO: 0 /100 WBC
PLATELET # BLD AUTO: 660 K/UL (ref 150–350)
PMV BLD AUTO: 9.4 FL (ref 9.2–12.9)
POTASSIUM SERPL-SCNC: 3.8 MMOL/L (ref 3.5–5.1)
RBC # BLD AUTO: 3.84 M/UL (ref 4.6–6.2)
SODIUM SERPL-SCNC: 135 MMOL/L (ref 136–145)
WBC # BLD AUTO: 11.98 K/UL (ref 3.9–12.7)

## 2020-07-15 PROCEDURE — 94761 N-INVAS EAR/PLS OXIMETRY MLT: CPT

## 2020-07-15 PROCEDURE — 63600175 PHARM REV CODE 636 W HCPCS: Performed by: INTERNAL MEDICINE

## 2020-07-15 PROCEDURE — 80048 BASIC METABOLIC PNL TOTAL CA: CPT

## 2020-07-15 PROCEDURE — 25000003 PHARM REV CODE 250: Performed by: NURSE PRACTITIONER

## 2020-07-15 PROCEDURE — 85025 COMPLETE CBC W/AUTO DIFF WBC: CPT

## 2020-07-15 PROCEDURE — 25000003 PHARM REV CODE 250: Performed by: INTERNAL MEDICINE

## 2020-07-15 PROCEDURE — 36415 COLL VENOUS BLD VENIPUNCTURE: CPT

## 2020-07-15 RX ORDER — IPRATROPIUM BROMIDE AND ALBUTEROL SULFATE 2.5; .5 MG/3ML; MG/3ML
3 SOLUTION RESPIRATORY (INHALATION) EVERY 4 HOURS PRN
Qty: 1 BOX | Refills: 1 | Status: SHIPPED | OUTPATIENT
Start: 2020-07-15 | End: 2021-07-15

## 2020-07-15 RX ORDER — IBUPROFEN 100 MG/5ML
1000 SUSPENSION, ORAL (FINAL DOSE FORM) ORAL DAILY
Qty: 7 TABLET | Refills: 0 | Status: SHIPPED | OUTPATIENT
Start: 2020-07-15 | End: 2020-07-22

## 2020-07-15 RX ORDER — ZINC SULFATE 50(220)MG
220 CAPSULE ORAL DAILY
Qty: 7 CAPSULE | Refills: 0 | Status: SHIPPED | OUTPATIENT
Start: 2020-07-16 | End: 2020-07-15

## 2020-07-15 RX ORDER — IBUPROFEN 100 MG/5ML
1000 SUSPENSION, ORAL (FINAL DOSE FORM) ORAL DAILY
Qty: 7 TABLET | Refills: 0 | Status: SHIPPED | OUTPATIENT
Start: 2020-07-15 | End: 2020-07-15 | Stop reason: SDUPTHER

## 2020-07-15 RX ORDER — ZINC SULFATE 50(220)MG
220 CAPSULE ORAL DAILY
Qty: 7 CAPSULE | Refills: 0 | Status: SHIPPED | OUTPATIENT
Start: 2020-07-16 | End: 2020-07-23

## 2020-07-15 RX ADMIN — ASPIRIN 81 MG: 81 TABLET, COATED ORAL at 09:07

## 2020-07-15 RX ADMIN — DEXAMETHASONE 6 MG: 4 TABLET ORAL at 09:07

## 2020-07-15 RX ADMIN — POLYETHYLENE GLYCOL 3350 17 G: 17 POWDER, FOR SOLUTION ORAL at 09:07

## 2020-07-15 RX ADMIN — LEVOTHYROXINE SODIUM 75 MCG: 75 TABLET ORAL at 05:07

## 2020-07-15 RX ADMIN — OXYCODONE HYDROCHLORIDE AND ACETAMINOPHEN 500 MG: 500 TABLET ORAL at 09:07

## 2020-07-15 RX ADMIN — METOPROLOL SUCCINATE 25 MG: 25 TABLET, EXTENDED RELEASE ORAL at 09:07

## 2020-07-15 RX ADMIN — Medication 220 MG: at 09:07

## 2020-07-15 RX ADMIN — LOSARTAN POTASSIUM 25 MG: 25 TABLET ORAL at 09:07

## 2020-07-15 RX ADMIN — DOCUSATE SODIUM 100 MG: 100 CAPSULE, LIQUID FILLED ORAL at 09:07

## 2020-07-15 RX ADMIN — FAMOTIDINE 20 MG: 20 TABLET ORAL at 09:07

## 2020-07-15 NOTE — NURSING
Discharge instructions thoroughly reviewed with patient's family via phone.  PIV intact on removal.   Tele monitor returned to monitor room.  AVS printed and prescription delivered at bedside.  Stressed the importance of attending/keeping f/u appts.  Family voiced that son, Yogesh, will be here to  the pt around 1500.

## 2020-07-15 NOTE — DISCHARGE INSTRUCTIONS
Please contact physicians for follow up appointments.    Dr. Patino is located here in [place] and will also do Virtual Visits.

## 2020-07-15 NOTE — PLAN OF CARE
POC REVIEWED. COMFORT MEASURES AND SAFETY MEASURES ADDRESSED. TELE SITTER DEVICE AT BEDSIDE. PT REMAINS FREE FROM FALLS AND INJURY THIS SHIFT. TELEMETRY MONITORING NSR. WILL CONTINUE TO MONITOR  Problem: Fall Injury Risk  Goal: Absence of Fall and Fall-Related Injury  7/15/2020 0215 by Kelly Zarco RN  Outcome: Ongoing, Progressing  7/15/2020 0215 by Kelly Zarco RN  Outcome: Ongoing, Progressing     Problem: Adult Inpatient Plan of Care  Goal: Plan of Care Review  7/15/2020 0215 by Kelly Zarco RN  Outcome: Ongoing, Progressing  7/15/2020 0215 by Kelly Zarco RN  Outcome: Ongoing, Progressing  Goal: Patient-Specific Goal (Individualization)  7/15/2020 0215 by Kelly Zarco RN  Outcome: Ongoing, Progressing  7/15/2020 0215 by Kelly Zarco RN  Outcome: Ongoing, Progressing  Goal: Absence of Hospital-Acquired Illness or Injury  7/15/2020 0215 by Kelly Zarco RN  Outcome: Ongoing, Progressing  7/15/2020 0215 by Kelly Zarco RN  Outcome: Ongoing, Progressing  Goal: Optimal Comfort and Wellbeing  7/15/2020 0215 by Kelly Zarco RN  Outcome: Ongoing, Progressing  7/15/2020 0215 by Kelly Zarco RN  Outcome: Ongoing, Progressing  Goal: Readiness for Transition of Care  7/15/2020 0215 by Kelly Zarco RN  Outcome: Ongoing, Progressing  7/15/2020 0215 by Kelly Zarco RN  Outcome: Ongoing, Progressing  Goal: Rounds/Family Conference  7/15/2020 0215 by Kelly Zarco RN  Outcome: Ongoing, Progressing  7/15/2020 0215 by Kelly Zarco RN  Outcome: Ongoing, Progressing

## 2020-07-15 NOTE — NURSING
Discharge instructions reviewed with pt at bedside & family via phone.  Nebulizer delivered and given to patient.  Tele monitor returned to monitor room.  PIV intact on removal.   AVS printed and given to patient.  Prescriptions sent to [facility].  Stressed the importance of keeping/attending f/u appts.  Instructed to call for wheelchair when ride is here.

## 2020-07-16 ENCOUNTER — PATIENT OUTREACH (OUTPATIENT)
Dept: ADMINISTRATIVE | Facility: CLINIC | Age: 75
End: 2020-07-16

## 2020-07-16 NOTE — PATIENT INSTRUCTIONS

## 2020-07-16 NOTE — PLAN OF CARE
07/16/20 0757   Final Note   Assessment Type Final Discharge Note   Anticipated Discharge Disposition Home-Health   Right Care Referral Info   Post Acute Recommendation Home-care   Facility Name [facility]

## 2020-07-17 NOTE — ASSESSMENT & PLAN NOTE
Blood cx  IV rocephin and azithromycin   Duoneb tx prn   Supplemental oxygen   Pt was recently admitted to [facility] for bilateral pna. Spoke with pharmacist at [facility] pt did not receive abx during his stay.

## 2020-07-17 NOTE — DISCHARGE SUMMARY
[facility] Hospital Medicine  Discharge Summary      Patient Name: Brenton Ugalde  MRN: [id number]  Admission Date: 7/13/2020  Hospital Length of Stay: 1 days  Discharge Date and Time: 7/15/2020  3:32 PM  Attending Physician: No att. providers found   Discharging Provider: Luis Martinez MD  Primary Care Provider: Lizy Ayala MD      HPI:   Brenton Ugalde is a 75 y.o. male patient with a PMHx of HTN, CAD, BPH, Hyperlipidemia, Hypothyroidism, and   pneumonia who presents to the Emergency Department for evaluation of worsening SOB. Patient was dx with COVID by PCP on 7/2/20 and was given a z-pack. On 7/7/20 pt admitted to [facility] with bilateral pneumonia and was later transferred to [facility], and was discharged yesterday. Pt had a home pulse ox saturation in the mid 70's. Associated sxs include N/V, generalized myalgias, cough, and generalized weakness. Patient denies any CP, diarrhea, HA, dizziness, numbness, and all other sxs at this time. In the ED, Na 132, BUN 25, CRP 86.7, , troponin 0.034, CXR revealed bilateral PNA. Patient placed in observation for PNA, elevated troponin, and hypoxia.         * No surgery found *      Hospital Course:   76 y/o wm admitted with a Dx of COVID-19 PNA . He  Was dx  With COVID-19 a few dasy ago and d/c home .  The o2 eval form today  Show a  o2 sat > 92 % at rest and on exertion .    7/15  Pt was seen and examined at bedside . He was determined to be suitable for d/c   -Pt did not qualify for home o2  -PT/OT recommend HH   -There was no acute event  Since admission  -He denies any SI/VH/AH     Consults:     PNA (pneumonia)  Blood cx  IV rocephin and azithromycin   Duoneb tx prn   Supplemental oxygen   Pt was recently admitted to [facility] for bilateral pna. Spoke with pharmacist at [facility] pt did not receive abx during his stay.       Hyperlipemia  Continue statin         CAD (coronary artery disease)  Continue ASA, BB, Losartan, Statin  "  Denies any chest pain   EKG unrevealing         HTN (hypertension)  Continue losartan and BB with holding parameters   Monitor closely       Suspected Covid-19 Virus Infection  Pt Had a (+) COVID-19 test  A few days ago  Rapid test for this admission is negative   Cont Multivitamins and minerals   Started on decadron         Final Active Diagnoses:    Diagnosis Date Noted POA    HTN (hypertension) [I10] 07/13/2020 Yes    CAD (coronary artery disease) [I25.10] 07/13/2020 Yes    Hyperlipemia [E78.5] 07/13/2020 Yes    PNA (pneumonia) [J18.9] 07/13/2020 Yes    Suspected Covid-19 Virus Infection [R68.89] 07/13/2020 Yes      Problems Resolved During this Admission:    Diagnosis Date Noted Date Resolved POA    PRINCIPAL PROBLEM:  Acute respiratory failure with hypoxia [J96.01] 07/13/2020 07/15/2020 Yes    Elevated troponin [R79.89] 07/13/2020 07/15/2020 Yes       Discharged Condition: stable    Disposition: Home-Health Care Sv    Follow Up:  Follow-up Information     Lizy Ayala MD In 1 week.    Specialty: Family Medicine  Contact information:  [address] [place] [address]  [facility]  [place] [id number] [phone]             Delilah Patino MD.    Specialty: Psychiatry  Contact information:  [address] [place] [id number]  [phone]                 Patient Instructions:      NEBULIZER KIT (SUPPLIES) FOR HOME USE     Order Specific Question Answer Comments   Height: 5' 10" (1.778 m)    Weight: 79.9 kg (176 lb 2.4 oz)    Does patient have medical equipment at home? none    Length of need (1-99 months): 12    Mask or Mouthpiece? Mouthpiece      NEBULIZER FOR HOME USE     Order Specific Question Answer Comments   Height: 5' 10" (1.778 m)    Weight: 79.9 kg (176 lb 2.4 oz)    Does patient have medical equipment at home? none    Length of need (1-99 months): 12      Ambulatory referral/consult to Home Health   Standing Status: Future   Referral Priority: Routine Referral Type: " Home Health   Referral Reason: Specialty Services Required   Requested Specialty: Home Health Services   Number of Visits Requested: 1     Diet Cardiac     Notify your health care provider if you experience any of the following:  temperature >100.4     Notify your health care provider if you experience any of the following:  persistent nausea and vomiting or diarrhea     Notify your health care provider if you experience any of the following:  severe uncontrolled pain     Notify your health care provider if you experience any of the following:  redness, tenderness, or signs of infection (pain, swelling, redness, odor or green/yellow discharge around incision site)     Notify your health care provider if you experience any of the following:  difficulty breathing or increased cough     Notify your health care provider if you experience any of the following:  severe persistent headache     Notify your health care provider if you experience any of the following:  worsening rash     Notify your health care provider if you experience any of the following:  persistent dizziness, light-headedness, or visual disturbances     Notify your health care provider if you experience any of the following:  increased confusion or weakness     Notify your health care provider if you experience any of the following:     Activity as tolerated       Significant Diagnostic Studies: Labs: BMP: No results for input(s): GLU, NA, K, CL, CO2, BUN, CREATININE, CALCIUM, MG in the last 48 hours., CMP No results for input(s): NA, K, CL, CO2, GLU, BUN, CREATININE, CALCIUM, PROT, ALBUMIN, BILITOT, ALKPHOS, AST, ALT, ANIONGAP, ESTGFRAFRICA, EGFRNONAA in the last 48 hours. and CBC No results for input(s): WBC, HGB, HCT, PLT in the last 48 hours.    Pending Diagnostic Studies:     None         Medications:  Reconciled Home Medications:      Medication List      START taking these medications    albuterol-ipratropium 2.5 mg-0.5 mg/3 mL nebulizer  solution  Commonly known as: DUO-NEB  Take 3 mLs by nebulization every 4 (four) hours as needed for Wheezing. Rescue     ascorbic acid (vitamin C) 1000 MG tablet  Commonly known as: VITAMIN C  Take 1 tablet (1,000 mg total) by mouth once daily. for 7 days     dextromethorphan-guaifenesin  mg  mg per 12 hr tablet  Commonly known as: MUCINEX DM  Take 1 tablet by mouth 2 (two) times daily. for 7 days     zinc sulfate 220 (50) mg capsule  Commonly known as: ZINCATE  Take 1 capsule (220 mg total) by mouth once daily. for 7 days        CONTINUE taking these medications    ALBUTEROL INHL  Inhale into the lungs.     aspirin 81 MG EC tablet  Commonly known as: ECOTRIN  Take 81 mg by mouth.     atorvastatin 80 MG tablet  Commonly known as: LIPITOR  TAKE ONE TABLET BY MOUTH EVERY EVENING     finasteride 5 mg tablet  Commonly known as: PROSCAR  TAKE ONE TABLET BY MOUTH EVERY DAY IN THE EVENING     furosemide 20 MG tablet  Commonly known as: LASIX  TAKE ONE TABLET BY MOUTH EVERY DAY.     irbesartan 150 MG tablet  Commonly known as: AVAPRO  TAKE ONE TABLET BY MOUTH EVERY DAY     isosorbide mononitrate 30 MG 24 hr tablet  Commonly known as: IMDUR     levothyroxine 75 MCG tablet  Commonly known as: SYNTHROID  TAKE ONE TABLET BY MOUTH EVERY DAY ON AN EMPTY STOMACH     metoprolol succinate 25 MG 24 hr tablet  Commonly known as: TOPROL-XL  Take 25 mg by mouth.     nitroGLYCERIN 0.4 MG SL tablet  Commonly known as: NITROSTAT  Place 0.4 mg under the tongue.     pantoprazole 40 MG tablet  Commonly known as: PROTONIX  TAKE ONE TABLET EVERY DAY     potassium chloride SA 10 MEQ tablet  Commonly known as: K-DUR,KLOR-CON  TAKE ONE TABLET EVERY DAY     ranolazine 1,000 mg Tb12  Commonly known as: RANEXA  Take 1,000 mg by mouth.     tamsulosin 0.4 mg Cap  Commonly known as: FLOMAX  TAKE ONE CAPSULE BY MOUTH EVERY DAY IN THE EVENING     vitamin E 400 UNIT capsule  Take 400 Units by mouth.     ZITHROMAX Z-CATY 250 MG tablet  Generic  drug: azithromycin  Take 250 mg by mouth once daily.            Indwelling Lines/Drains at time of discharge:   Lines/Drains/Airways     None                 Time spent on the discharge of patient: 35 minutes  Patient was seen and examined on the date of discharge and determined to be suitable for discharge.         Luis Martinez MD  Department of Hospital Medicine  [facility]

## 2020-07-19 LAB
BACTERIA BLD CULT: NORMAL
BACTERIA BLD CULT: NORMAL

## 2020-07-20 NOTE — PHYSICIAN QUERY
PT Name: Brenton Ugalde  MR #: [id number]     Documentation Clarification      CDS: Tomi Mattson RN CCDS               Contact information: [email or web address]     This form is a permanent document in the medical record.     Query Date: July 20, 2020    By submitting this query, we are merely seeking further clarification of documentation. Please utilize your independent clinical judgment when addressing the question(s) below.    The Medical Record reflects the following:    Clinical Findings Location in Medical Record   Acute respiratory failure with hypoxia  Likely due to Bilateral PNA   O2 sats 95-98% on RA    Hypoxia  SOB    Pulmonary:      Effort: Pulmonary effort is normal. No tachypnea, accessory muscle usage or respiratory distress.      Breath sounds: Examination of the right-lower field reveals rales. Examination of the left-lower field reveals rales. Rales present.   7/13/2020 H&P Phyllis Lock NP   76 y/o wm admitted with a Dx of COVID-19 PNA .    Pt did not qualify for home 02  Per  Respiratory o2 eval 7/14/2020 Hospital Medicine progress notes Luis Martinez MD   O2 sat ranging from 92-97%   RR 17-20  Supplemental O2 @ 2L from 3037-6155, on RA the remainder of the day    O2 sat ranging from 90-97%  RR 18  Supplemental O2 @ 2L from until 0900 then on RA 7/13/2020 Vitals        7/14/2020 Vitals   Significant peripheral and basilar ground-glass/alveolar infiltrates consistent with the history given of suspected COVID-19 infection 7/13/2020 Chest x-ray                                                                            Provider, please clarify the diagnosis of Acute respiratory failure with hypoxia:      [   ] Acute respiratory failure with hypoxia ruled in and additional clinical support/decision-making indicators for the diagnosis include (please specify):__________________________   [   x] Acute respiratory failure with hypoxia has been ruled out   [   ] Acute respiratory  failure with hypoxia has been ruled out, other diagnosis ruled in:      [   ] Hypoxia only      [   ] Other (please specify):_______________   [   ] Other clarification (please specify): ___________________   [  ] Clinically undetermined

## 2020-07-27 ENCOUNTER — EXTERNAL HOME HEALTH (OUTPATIENT)
Dept: HOME HEALTH SERVICES | Facility: HOSPITAL | Age: 75
End: 2020-07-27
Payer: MEDICARE